# Patient Record
Sex: FEMALE | Race: WHITE | NOT HISPANIC OR LATINO | Employment: FULL TIME | URBAN - METROPOLITAN AREA
[De-identification: names, ages, dates, MRNs, and addresses within clinical notes are randomized per-mention and may not be internally consistent; named-entity substitution may affect disease eponyms.]

---

## 2017-08-22 LAB
EXTERNAL RUBELLA IGG QUANTITATION: NORMAL
HBV SURFACE AG SER QL: NEGATIVE
RPR SER QL: NORMAL

## 2018-03-08 LAB — GP B STREP SPEC QL CULT: NO GROWTH

## 2018-03-28 ENCOUNTER — ANESTHESIA EVENT (INPATIENT)
Dept: OBSTETRICS AND GYNECOLOGY | Facility: HOSPITAL | Age: 33
End: 2018-03-28
Payer: COMMERCIAL

## 2018-03-28 ENCOUNTER — HOSPITAL ENCOUNTER (INPATIENT)
Facility: HOSPITAL | Age: 33
LOS: 3 days | Discharge: HOME | End: 2018-03-31
Attending: OBSTETRICS & GYNECOLOGY | Admitting: OBSTETRICS & GYNECOLOGY
Payer: COMMERCIAL

## 2018-03-28 ENCOUNTER — ANESTHESIA (INPATIENT)
Dept: OBSTETRICS AND GYNECOLOGY | Facility: HOSPITAL | Age: 33
End: 2018-03-28
Payer: COMMERCIAL

## 2018-03-28 PROBLEM — O32.9XX0 FETAL MALPRESENTATION: Status: ACTIVE | Noted: 2018-03-28

## 2018-03-28 LAB
ABO + RH BLD: NORMAL
BLD GP AB SCN SERPL QL: NEGATIVE
D AG BLD QL: POSITIVE
ERYTHROCYTE [DISTWIDTH] IN BLOOD BY AUTOMATED COUNT: 13.1 % (ref 11.7–14.4)
HCT VFR BLDCO AUTO: 37.7 % (ref 35–45)
HGB BLD-MCNC: 13.1 G/DL (ref 11.8–15.7)
MCH RBC QN AUTO: 29.6 PG (ref 28–33.2)
MCHC RBC AUTO-ENTMCNC: 34.7 G/DL (ref 32.2–35.5)
MCV RBC AUTO: 85.3 FL (ref 83–98)
PDW BLD AUTO: 11 FL (ref 9.4–12.3)
PLATELET # BLD AUTO: 278 K/UL (ref 150–369)
RBC # BLD AUTO: 4.42 M/UL (ref 3.93–5.22)
RPR SER QL: NORMAL
WBC # BLD AUTO: 11.23 K/UL (ref 3.8–10.5)

## 2018-03-28 PROCEDURE — 37000005 ANESTHESIA CSE BLOCK: Performed by: ANESTHESIOLOGY

## 2018-03-28 PROCEDURE — 86901 BLOOD TYPING SEROLOGIC RH(D): CPT

## 2018-03-28 PROCEDURE — 27200121 HC CATH FOLEY

## 2018-03-28 PROCEDURE — 36415 COLL VENOUS BLD VENIPUNCTURE: CPT | Performed by: OBSTETRICS & GYNECOLOGY

## 2018-03-28 PROCEDURE — 25000000 HC PHARMACY GENERAL: Performed by: STUDENT IN AN ORGANIZED HEALTH CARE EDUCATION/TRAINING PROGRAM

## 2018-03-28 PROCEDURE — 63700000 HC SELF-ADMINISTRABLE DRUG: Performed by: OBSTETRICS & GYNECOLOGY

## 2018-03-28 PROCEDURE — 72000021 HC C SECTION LEVEL 1: Performed by: OBSTETRICS & GYNECOLOGY

## 2018-03-28 PROCEDURE — 86592 SYPHILIS TEST NON-TREP QUAL: CPT | Performed by: OBSTETRICS & GYNECOLOGY

## 2018-03-28 PROCEDURE — 63600000 HC DRUGS/DETAIL CODE: Performed by: OBSTETRICS & GYNECOLOGY

## 2018-03-28 PROCEDURE — 37000005 HC ANESTHESIA EPIDURAL/SPINAL: Performed by: OBSTETRICS & GYNECOLOGY

## 2018-03-28 PROCEDURE — 85027 COMPLETE CBC AUTOMATED: CPT | Performed by: OBSTETRICS & GYNECOLOGY

## 2018-03-28 PROCEDURE — 63600000 HC DRUGS/DETAIL CODE: Performed by: STUDENT IN AN ORGANIZED HEALTH CARE EDUCATION/TRAINING PROGRAM

## 2018-03-28 PROCEDURE — 63700000 HC SELF-ADMINISTRABLE DRUG: Performed by: STUDENT IN AN ORGANIZED HEALTH CARE EDUCATION/TRAINING PROGRAM

## 2018-03-28 PROCEDURE — 63600000 HC DRUGS/DETAIL CODE: Performed by: NURSE ANESTHETIST, CERTIFIED REGISTERED

## 2018-03-28 PROCEDURE — 25800000 HC PHARMACY IV SOLUTIONS: Performed by: OBSTETRICS & GYNECOLOGY

## 2018-03-28 PROCEDURE — 25000000 HC PHARMACY GENERAL: Performed by: NURSE ANESTHETIST, CERTIFIED REGISTERED

## 2018-03-28 PROCEDURE — 25800000 HC PHARMACY IV SOLUTIONS: Performed by: NURSE ANESTHETIST, CERTIFIED REGISTERED

## 2018-03-28 PROCEDURE — 12000000 HC ROOM AND CARE MED/SURG

## 2018-03-28 RX ORDER — KETOROLAC TROMETHAMINE 30 MG/ML
30 INJECTION, SOLUTION INTRAMUSCULAR; INTRAVENOUS
Status: DISPENSED | OUTPATIENT
Start: 2018-03-28 | End: 2018-03-30

## 2018-03-28 RX ORDER — ACETAMINOPHEN 325 MG/1
TABLET ORAL
Status: DISPENSED
Start: 2018-03-28 | End: 2018-03-29

## 2018-03-28 RX ORDER — DIPHENHYDRAMINE HYDROCHLORIDE 50 MG/ML
12.5 INJECTION INTRAMUSCULAR; INTRAVENOUS EVERY 6 HOURS PRN
Status: DISCONTINUED | OUTPATIENT
Start: 2018-03-28 | End: 2018-03-28 | Stop reason: HOSPADM

## 2018-03-28 RX ORDER — DIBUCAINE 1 %
1 OINTMENT (GRAM) TOPICAL AS NEEDED
Status: DISCONTINUED | OUTPATIENT
Start: 2018-03-28 | End: 2018-03-31 | Stop reason: HOSPADM

## 2018-03-28 RX ORDER — IBUPROFEN 600 MG/1
600 TABLET ORAL
Status: DISCONTINUED | OUTPATIENT
Start: 2018-03-30 | End: 2018-03-31 | Stop reason: HOSPADM

## 2018-03-28 RX ORDER — LANOLIN
1 WAX (GRAM) MISCELLANEOUS AS NEEDED
Status: DISCONTINUED | OUTPATIENT
Start: 2018-03-28 | End: 2018-03-31 | Stop reason: HOSPADM

## 2018-03-28 RX ORDER — OXYTOCIN/RINGER'S LACTATE 20/1000 ML
125 PLASTIC BAG, INJECTION (ML) INTRAVENOUS CONTINUOUS
Status: DISPENSED | OUTPATIENT
Start: 2018-03-28 | End: 2018-03-28

## 2018-03-28 RX ORDER — NALOXONE HYDROCHLORIDE 0.4 MG/ML
0.1 INJECTION, SOLUTION INTRAMUSCULAR; INTRAVENOUS; SUBCUTANEOUS
Status: DISCONTINUED | OUTPATIENT
Start: 2018-03-28 | End: 2018-03-28 | Stop reason: HOSPADM

## 2018-03-28 RX ORDER — GLYCOPYRROLATE 0.6MG/3ML
0.1 SYRINGE (ML) INTRAVENOUS ONCE
Status: COMPLETED | OUTPATIENT
Start: 2018-03-28 | End: 2018-03-28

## 2018-03-28 RX ORDER — DIPHENHYDRAMINE HYDROCHLORIDE 50 MG/ML
25 INJECTION INTRAMUSCULAR; INTRAVENOUS EVERY 6 HOURS PRN
Status: DISCONTINUED | OUTPATIENT
Start: 2018-03-28 | End: 2018-03-31 | Stop reason: HOSPADM

## 2018-03-28 RX ORDER — ACETAMINOPHEN 325 MG/1
975 TABLET ORAL EVERY 6 HOURS
Status: DISCONTINUED | OUTPATIENT
Start: 2018-03-28 | End: 2018-03-31 | Stop reason: HOSPADM

## 2018-03-28 RX ORDER — CALCIUM CARBONATE 200(500)MG
500 TABLET,CHEWABLE ORAL EVERY 4 HOURS PRN
Status: DISCONTINUED | OUTPATIENT
Start: 2018-03-28 | End: 2018-03-31 | Stop reason: HOSPADM

## 2018-03-28 RX ORDER — OXYTOCIN/RINGER'S LACTATE 20/1000 ML
PLASTIC BAG, INJECTION (ML) INTRAVENOUS AS NEEDED
Status: DISCONTINUED | OUTPATIENT
Start: 2018-03-28 | End: 2018-03-28 | Stop reason: SURG

## 2018-03-28 RX ORDER — NALOXONE HYDROCHLORIDE 0.4 MG/ML
0.4 INJECTION, SOLUTION INTRAMUSCULAR; INTRAVENOUS; SUBCUTANEOUS AS NEEDED
Status: ACTIVE | OUTPATIENT
Start: 2018-03-28 | End: 2018-03-30

## 2018-03-28 RX ORDER — ONDANSETRON 4 MG/1
4 TABLET, ORALLY DISINTEGRATING ORAL EVERY 8 HOURS PRN
Status: DISCONTINUED | OUTPATIENT
Start: 2018-03-28 | End: 2018-03-31 | Stop reason: HOSPADM

## 2018-03-28 RX ORDER — ONDANSETRON 8 MG/1
8 TABLET, ORALLY DISINTEGRATING ORAL ONCE
Status: COMPLETED | OUTPATIENT
Start: 2018-03-28 | End: 2018-03-28

## 2018-03-28 RX ORDER — MIDAZOLAM HYDROCHLORIDE 2 MG/2ML
INJECTION, SOLUTION INTRAMUSCULAR; INTRAVENOUS AS NEEDED
Status: DISCONTINUED | OUTPATIENT
Start: 2018-03-28 | End: 2018-03-28 | Stop reason: SURG

## 2018-03-28 RX ORDER — CEFAZOLIN SODIUM/WATER 1 G/10 ML
2 SYRINGE (ML) INTRAVENOUS
Status: COMPLETED | OUTPATIENT
Start: 2018-03-28 | End: 2018-03-28

## 2018-03-28 RX ORDER — DIPHENHYDRAMINE HCL 25 MG
25 CAPSULE ORAL EVERY 6 HOURS PRN
Status: DISCONTINUED | OUTPATIENT
Start: 2018-03-28 | End: 2018-03-31 | Stop reason: HOSPADM

## 2018-03-28 RX ORDER — ACETAMINOPHEN 325 MG/1
975 TABLET ORAL
Status: DISCONTINUED | OUTPATIENT
Start: 2018-03-28 | End: 2018-03-28

## 2018-03-28 RX ORDER — ONDANSETRON HYDROCHLORIDE 2 MG/ML
4 INJECTION, SOLUTION INTRAVENOUS EVERY 6 HOURS PRN
Status: DISCONTINUED | OUTPATIENT
Start: 2018-06-26 | End: 2018-03-28 | Stop reason: HOSPADM

## 2018-03-28 RX ORDER — PROCHLORPERAZINE EDISYLATE 5 MG/ML
10 INJECTION INTRAMUSCULAR; INTRAVENOUS EVERY 6 HOURS PRN
Status: DISCONTINUED | OUTPATIENT
Start: 2018-03-28 | End: 2018-03-28 | Stop reason: HOSPADM

## 2018-03-28 RX ORDER — SODIUM CHLORIDE, SODIUM LACTATE, POTASSIUM CHLORIDE, CALCIUM CHLORIDE 600; 310; 30; 20 MG/100ML; MG/100ML; MG/100ML; MG/100ML
1000 INJECTION, SOLUTION INTRAVENOUS ONCE
Status: COMPLETED | OUTPATIENT
Start: 2018-03-28 | End: 2018-03-29

## 2018-03-28 RX ORDER — HYDROMORPHONE HYDROCHLORIDE 2 MG/ML
0.5 INJECTION, SOLUTION INTRAMUSCULAR; INTRAVENOUS; SUBCUTANEOUS
Status: DISCONTINUED | OUTPATIENT
Start: 2018-03-28 | End: 2018-03-31 | Stop reason: HOSPADM

## 2018-03-28 RX ORDER — BUPIVACAINE HYDROCHLORIDE 5 MG/ML
INJECTION, SOLUTION EPIDURAL; INTRACAUDAL AS NEEDED
Status: DISCONTINUED | OUTPATIENT
Start: 2018-03-28 | End: 2018-03-28 | Stop reason: SURG

## 2018-03-28 RX ORDER — MORPHINE SULFATE 0.5 MG/ML
INJECTION, SOLUTION EPIDURAL; INTRATHECAL; INTRAVENOUS AS NEEDED
Status: DISCONTINUED | OUTPATIENT
Start: 2018-03-28 | End: 2018-03-28 | Stop reason: SURG

## 2018-03-28 RX ORDER — ALUMINUM HYDROXIDE, MAGNESIUM HYDROXIDE, AND SIMETHICONE 1200; 120; 1200 MG/30ML; MG/30ML; MG/30ML
30 SUSPENSION ORAL EVERY 4 HOURS PRN
Status: DISCONTINUED | OUTPATIENT
Start: 2018-03-28 | End: 2018-03-31 | Stop reason: HOSPADM

## 2018-03-28 RX ORDER — OXYCODONE HYDROCHLORIDE 5 MG/1
5-10 TABLET ORAL EVERY 4 HOURS PRN
Status: DISCONTINUED | OUTPATIENT
Start: 2018-03-28 | End: 2018-03-31 | Stop reason: HOSPADM

## 2018-03-28 RX ORDER — CEFAZOLIN SODIUM/WATER 1 G/10 ML
SYRINGE (ML) INTRAVENOUS AS NEEDED
Status: DISCONTINUED | OUTPATIENT
Start: 2018-03-28 | End: 2018-03-28 | Stop reason: SURG

## 2018-03-28 RX ORDER — SODIUM CHLORIDE, SODIUM LACTATE, POTASSIUM CHLORIDE, CALCIUM CHLORIDE 600; 310; 30; 20 MG/100ML; MG/100ML; MG/100ML; MG/100ML
80 INJECTION, SOLUTION INTRAVENOUS CONTINUOUS
Status: DISCONTINUED | OUTPATIENT
Start: 2018-03-28 | End: 2018-03-31 | Stop reason: HOSPADM

## 2018-03-28 RX ORDER — ONDANSETRON HYDROCHLORIDE 2 MG/ML
4 INJECTION, SOLUTION INTRAVENOUS EVERY 8 HOURS PRN
Status: DISCONTINUED | OUTPATIENT
Start: 2018-03-28 | End: 2018-03-31 | Stop reason: HOSPADM

## 2018-03-28 RX ORDER — FENTANYL CITRATE 50 UG/ML
INJECTION, SOLUTION INTRAMUSCULAR; INTRAVENOUS AS NEEDED
Status: DISCONTINUED | OUTPATIENT
Start: 2018-03-28 | End: 2018-03-28 | Stop reason: SURG

## 2018-03-28 RX ORDER — OXYTOCIN/RINGER'S LACTATE 20/1000 ML
PLASTIC BAG, INJECTION (ML) INTRAVENOUS AS NEEDED
Status: DISCONTINUED | OUTPATIENT
Start: 2018-03-28 | End: 2018-03-28

## 2018-03-28 RX ORDER — AMOXICILLIN 250 MG
1 CAPSULE ORAL 2 TIMES DAILY
Status: DISCONTINUED | OUTPATIENT
Start: 2018-03-28 | End: 2018-03-31 | Stop reason: HOSPADM

## 2018-03-28 RX ORDER — ONDANSETRON HYDROCHLORIDE 2 MG/ML
INJECTION, SOLUTION INTRAVENOUS
Status: DISPENSED
Start: 2018-03-28 | End: 2018-03-29

## 2018-03-28 RX ORDER — ACETAMINOPHEN 325 MG/1
975 TABLET ORAL ONCE
Status: COMPLETED | OUTPATIENT
Start: 2018-03-28 | End: 2018-03-28

## 2018-03-28 RX ORDER — SODIUM CHLORIDE, SODIUM GLUCONATE, SODIUM ACETATE, POTASSIUM CHLORIDE AND MAGNESIUM CHLORIDE 30; 37; 368; 526; 502 MG/100ML; MG/100ML; MG/100ML; MG/100ML; MG/100ML
INJECTION, SOLUTION INTRAVENOUS CONTINUOUS PRN
Status: DISCONTINUED | OUTPATIENT
Start: 2018-03-28 | End: 2018-03-28 | Stop reason: SURG

## 2018-03-28 RX ADMIN — FENTANYL CITRATE 25 MCG: 50 INJECTION, SOLUTION INTRAMUSCULAR; INTRAVENOUS at 10:04

## 2018-03-28 RX ADMIN — GLYCOPYRROLATE 0.2 MG: 0.2 INJECTION INTRAMUSCULAR; INTRAVENOUS at 10:06

## 2018-03-28 RX ADMIN — ACETAMINOPHEN 975 MG: 325 TABLET ORAL at 08:37

## 2018-03-28 RX ADMIN — Medication 2 G: at 09:09

## 2018-03-28 RX ADMIN — MORPHINE SULFATE 0.25 MG: 0.5 INJECTION, SOLUTION EPIDURAL; INTRATHECAL; INTRAVENOUS at 10:04

## 2018-03-28 RX ADMIN — ACETAMINOPHEN 975 MG: 325 TABLET ORAL at 20:52

## 2018-03-28 RX ADMIN — KETOROLAC TROMETHAMINE 30 MG: 30 INJECTION, SOLUTION INTRAMUSCULAR at 12:47

## 2018-03-28 RX ADMIN — Medication 125 ML/HR: at 14:05

## 2018-03-28 RX ADMIN — Medication 100 ML: at 11:05

## 2018-03-28 RX ADMIN — Medication 250 ML: at 10:32

## 2018-03-28 RX ADMIN — ONDANSETRON 8 MG: 8 TABLET, ORALLY DISINTEGRATING ORAL at 08:38

## 2018-03-28 RX ADMIN — Medication 250 ML: at 10:45

## 2018-03-28 RX ADMIN — Medication 1 G: at 10:12

## 2018-03-28 RX ADMIN — HYDROMORPHONE HYDROCHLORIDE 0.5 MG: 2 INJECTION, SOLUTION INTRAMUSCULAR; INTRAVENOUS; SUBCUTANEOUS at 17:27

## 2018-03-28 RX ADMIN — SODIUM CHLORIDE, POTASSIUM CHLORIDE, SODIUM LACTATE AND CALCIUM CHLORIDE 1000 ML: 600; 310; 30; 20 INJECTION, SOLUTION INTRAVENOUS at 09:23

## 2018-03-28 RX ADMIN — SENNOSIDES AND DOCUSATE SODIUM 1 TABLET: 8.6; 5 TABLET ORAL at 20:05

## 2018-03-28 RX ADMIN — BUPIVACAINE HYDROCHLORIDE 2.5 ML: 5 INJECTION, SOLUTION EPIDURAL; INTRACAUDAL; PERINEURAL at 10:04

## 2018-03-28 RX ADMIN — SODIUM CHLORIDE, SODIUM GLUCONATE, SODIUM ACETATE, POTASSIUM CHLORIDE AND MAGNESIUM CHLORIDE: 526; 502; 368; 37; 30 INJECTION, SOLUTION INTRAVENOUS at 10:00

## 2018-03-28 RX ADMIN — ONDANSETRON 4 MG: 2 INJECTION, SOLUTION INTRAMUSCULAR; INTRAVENOUS at 14:23

## 2018-03-28 RX ADMIN — PROMETHAZINE HYDROCHLORIDE 12.5 MG: 25 INJECTION INTRAMUSCULAR; INTRAVENOUS at 17:40

## 2018-03-28 RX ADMIN — SODIUM CHLORIDE, POTASSIUM CHLORIDE, SODIUM LACTATE AND CALCIUM CHLORIDE 80 ML/HR: 600; 310; 30; 20 INJECTION, SOLUTION INTRAVENOUS at 22:39

## 2018-03-28 RX ADMIN — MIDAZOLAM HYDROCHLORIDE 1 MG: 1 INJECTION, SOLUTION INTRAMUSCULAR; INTRAVENOUS at 10:07

## 2018-03-28 RX ADMIN — ACETAMINOPHEN 975 MG: 325 TABLET ORAL at 14:09

## 2018-03-28 RX ADMIN — KETOROLAC TROMETHAMINE 30 MG: 30 INJECTION, SOLUTION INTRAMUSCULAR at 18:41

## 2018-03-28 ASSESSMENT — PAIN SCALES - GENERAL: PAIN_LEVEL: 0

## 2018-03-28 NOTE — H&P (VIEW-ONLY)
HPI     Grace Mobley is a 32 y.o. female  at 39 2/7 wks with an estimated due date of 18 by LMP and first trimester u/s who presents for primary csxn due to breech presentation. Patient declined attempt at external cephalic version. This has been an uncomplicated pregnancy. CF/SMA/FX all neg. Seq. Screen neg. Placenta is posterior.    Last PO intake:   ,     ,      OB History:   Obstetric History       T0      L0     SAB0   TAB0   Ectopic0   Multiple0   Live Births0       # Outcome Date GA Lbr Elijah/2nd Weight Sex Delivery Anes PTL Lv   1 Current                   Medical History: Anxiety/depression, asthma, GERD, IBS, migraines    Surgical History: Lasik eye surgery    Social History:   Social History     Social History   • Marital status:      Spouse name: Zeyad   • Number of children: none   • Years of education: N/A     Social History Main Topics   • Smoking status: Never   • Smokeless tobacco: Never   • Alcohol use None   • Drug use: None   • Sexual activity: With partner     Other Topics Concern   • Not on file     Social History Narrative   • No narrative on file        Family History: Sister - diabetes Type I, Father - HTN    Allergies: Latex, natural rubber, Cipro    Prior to Admission medications    PNV       Review of Systems  All other systems reviewed and negative except as noted in the HPI.    Objective     Vital Signs for the last 24 hours:  To be done on admission    Latest cervical exam:             Additional Tests:   Sterile Speculum Exam: no      Fetal Monitoring:  FHR Baseline: on admission  FHR Variability:   FHR Accelerations:   FHR Decelerations:     Contraction Frequency:     Exam:  General Appearance: Alert, cooperative, no acute distress  Lungs: Clear to auscultation bilaterally, respirations unlabored  Heart: Regular rate and rhythm, S1 and S2 normal, no murmur, rub or gallop  Abdomen: gravid, nontender  Genitalia: See vaginal exam  Extremities:  no edema or calf tenderness  Neurologic: grossly intact without focal deficits    Ultrasounds:   I have reviewed the applicable Ultrasounds.    Bedside Ultrasounds:    Labs:  O + , Rubella IMMUNE, GBS neg    Assessment/Plan     Grace Mobley is a 32 y.o. female  at 39 2/7 wks for primary csxn due to breech presentation.    FHR: To be done on admission.  GBS: negative     Plan for Ancef for pre op abx. VTE prophylaxis with SCDs.    Dominique Ballard, DO

## 2018-03-28 NOTE — OP NOTE
OB  Delivery OP Note    Date of Procedure: 3/28/2018  Patient:Grace Mobley  :1985    Procedure:    Primary  Breech ( Latex Allergy )  CPT(R) Code:  78272 - [place] FULL ROUT OBSTE CARE, DELIV     Review the Delivery Report for details.      Details    Pre-Op Diagnosis: 1. 32 y.o.  Intrauterine pregnancy at 39w2d  2. Liza Breech    Post-Op Diagnosis: 1. Same    Procedure:    , Low Transverse  via   uterine incision and   skin incision.   Anesthesia: Epidural    Findings: Normal uterus, tubes, and ovaries.   EBL  700 mL   Complications: None     Specimen Information:  Cord blood X2 and public cord blood banking     Drains: Castillo draining clear urine    Staff:  Surgeon(s):  DO Venita Biswas MD  Anesthesiologist: Carl Costello MD  CRNA: Cherrie Bashir CRNA   Circulator: Ceci Lundy RN  Scrub Person: Graciela Siu  Baby Nurse: Judith Gallagher RN  Other Staff:     Delivery Assist;Delivery Nurse     INFANT INFORMATION  Time of Birth:10:30 AM   Presentation:    liza breech   Cord: 3 vessels ,Complications:  none   [place] Sex: female   [place] Weight: 3.3 kg      1 Minute 5 Minute 10 Minute   Apgar Totals: 8    9            Information for the patient's :  Julio C Paredes [[id number]]      Cord Gas     None          Informed Consent:  An informed consent was obtained.    Procedure Details:  The patient was taken to the operating room where Epidural  anesthesia was placed and found to be adequate. Antibiotics were given for infection prophylaxis. The patient was prepped and draped in the normal sterile fashion.  A timeout was performed.  A Pfannenstiel skin incision was made with the scalpel. The incision was carried down to the fascia using the bovie. The fascia was incised and this was extended laterally with the bovie. The fascia was grasped with Kocher clamps and the underlying rectus muscle was dissected  off.  The rectus muscles were  bluntly. The peritoneum was identified and entered sharply with metzembaums. The peritoneum was dissected to allow for adequate visualization.    The bladder blade was inserted. The vesicouterine peritoneum was identified.The lower uterine segment was then incised with a sharp incision and was extended bluntly. The amniotic sac was ruptured and Clear  fluid noted. The bladder blade was removed and the infant was delivered liza breech and delivered atraumatically using the usual maneuvers. The remainder of the infant was delivered, a spontaneous cry was heard, and the infant appeared to be moving all 4 extremities. The cord clamped and cut, cord blood x2 was collected, and public cord blood was collected as well.  The  baby was handed off to the awaiting clinicians and neonatology staff.    The placenta was removed manually. The uterus was then exteriorized and cleared of all clots and debris. The hysterotomy was repaired with #1 Vicryl in a running locked fashion. The ovaries and tubes appeared normal bilaterally. The uterus was then returned to the abdomen. The uterine incision was reinspected and found to be hemostatic. The gutters were inspected and cleared of all debris. The fascia was then reapproximated with a running suture of #1 Vicryl. The skin was closed with 4-0 Monocryl.    The patient tolerated the procedure well. The sponge, instrument, and needle counts were correct and the patient was taken to recovery in stable condition.      Venita Lopes MD

## 2018-03-28 NOTE — PROGRESS NOTES
Obstetrics Postpartum Progress Note    Events  No acute events overnight.    Subjective  Pain: no  Bleeding: lochia minimal  Diet: vomitted X2, unable to tolerate clears at this time, recieved zofran   Voiding: donaldson in place  Bowel: no flatus  Ambulating: as tolerated    Vitals  Temp:  [36.3 °C (97.4 °F)-36.9 °C (98.5 °F)] 36.3 °C (97.4 °F)  Heart Rate:  [] 53  Resp:  [22] 22  BP: (108-130)/(56-64) 130/59    I&O    Intake/Output Summary (Last 24 hours) at 18 1440  Last data filed at 18 1300   Gross per 24 hour   Intake             1050 ml   Output              850 ml   Net              200 ml       Physical Exam  General: well and resting  Heart: Regular rate and rhythm  Lungs: Clear to auscultation bilaterally  Abdomen: soft, nondistended, non-tender, positive bowel sounds  Fundus: firm  Incision: dressing clean, dry, intact  Perineum: deferred  Extremities: symmetric and no edema    Labs  Labs Reviewed:  Lab Results   Component Value Date    ABO O 2018    LABRH Positive 2018      Rubella: immune    Assessment/Plan   Problem-based Assessment and Plan    Grace Mobley is a 32 y.o.  postop day 0 s/p , Low Transverse  2/2 breech presentation     1. Vital Signs: stable  2. Hemodynamics: stable, CBC in am, preop hg 13.1   3. Pain: controlled  4. VTE Assessment: Early Ambulation, SCDs  5. Vaccinations/Rhogam: rhogam not indicated   6. Post care: meeting all goals   7. Will remove donaldson in am if UOP is adequate      Venita Lopes MD

## 2018-03-28 NOTE — ANESTHESIA POSTPROCEDURE EVALUATION
"Patient: Grace Mobley    Procedure Summary     Date:  18 Room / Location:  [facility] L&D 1 / [facility] L[facility] OR    Anesthesia Start:  1000 Anesthesia Stop:  1122    Procedure:  Primary  Breech ( Latex Allergy ) (N/A Abdomen) Diagnosis:  (Preg)    Surgeon:  Dominique Ballard DO Responsible Provider:  Carl Costello MD    Anesthesia Type:  epidural ASA Status:  2      Vital normal range: yes  CV function and hydration status stable: yes  Respiratory function stable and airway patent: yes  Mental status recovered: yes  Pain control satisfactory: yes  Nausea/ vomiting controlled and satisfactory: yes    Discharge from PACU.    Anesthesia Type: epidural  PACU Vitals  3/28/2018 1114 - 3/28/2018 1214      3/28/2018 1128 3/28/2018 1134 3/28/2018 1135 3/28/2018 1138    BP: - - (!)  114/56 -    Temp: - 36.3 °C (97.4 °F) - -    Pulse: 70 62 - (!)  58    Resp: - (!)  22 - -    SpO2: 97 % 97 % - 97 %              3/28/2018 1143 3/28/2018 1149 3/28/2018 1150 3/28/2018 1153    BP: - - 108/64 -    Temp: - - - -    Pulse: (!)  56 (!)  58 (!)  55 62    Resp: - - - -    SpO2: 96 % 97 % - 96 %              3/28/2018 1158 3/28/2018 1204 3/28/2018 1205 3/28/2018 1208    BP: - - 109/61 -    Temp: - - - -    Pulse: (!)  58 62 60 (!)  58    Resp: - - - -    SpO2: 96 % 97 % - 96 %              3/28/2018 1213             BP: -       Temp: -       Pulse: (!)  58       Resp: -       SpO2: 96 %           Blood pressure (!) 130/59, pulse (!) 55, temperature 36.3 °C (97.4 °F), temperature source Oral, resp. rate (!) 22, height 1.626 m (5' 4\"), weight 64 kg (141 lb), SpO2 96 %, currently breastfeeding.      Anesthesia Post Evaluation    Pain score: 0  Pain management: adequate  Mode of pain management: additional block medications  Patient location during evaluation: PACU  Patient participation: complete - patient participated  Level of consciousness: awake and alert  Cardiovascular status: acceptable  Respiratory status: " acceptable  Hydration status: acceptable  Pain well controlled after spinal preservative free morphine administration: Yes  Continue 24 hours observation after preservative free morphine administration: Yes  Anesthetic complications: no

## 2018-03-28 NOTE — OR SURGEON
Pre-Procedure patient identification:  I am the primary operating surgeon/proceduralist and I have identified the patient on 03/28/18 at 12:14 PM Dominique Ballard DO  Phone Number: [phone]

## 2018-03-28 NOTE — ANESTHESIA PREPROCEDURE EVALUATION
Anesthesia ROS/MED HX    Anesthesia History - neg  Pulmonary - neg  Neuro/Psych - neg  Cardiovascular- neg  GI/Hepatic- neg  Endo/Other- neg   Body Habitus: Normal      Relevant Problems   No active problems are marked relevant to this note.     I have reviewed the following records for  Grace Mobley.      Current Facility-Administered Medications   Medication Dose Route Frequency Provider Last Rate Last Dose   • ceFAZolin in sterile water (ANCEF) injection 2 g  2 g intravenous 60 min Pre-Op Dominique Ballard DO       • lactated ringer's infusion 1,000 mL  1,000 mL intravenous Once Dominique Ballard DO         Prior to Admission medications    Not on File        Carl Costello MD        Physical Exam    Airway   Mallampati: I   TM distance: <3 FB   Neck ROM: full  Cardiovascular - normal   Rhythm: regular   Rate: normal  Pulmonary - normal   clear to auscultation  Other Findings   Back - neg   landmarks identified          Anesthesia Plan    Plan: epidural   ASA 2  Blood Products:     Use of Blood Products Discussed: Yes   Anesthetic plan and risks discussed with: patient  Postop Plan:   Patient Disposition: inpatient floor planned admission   Pain Management: IV analgesics

## 2018-03-28 NOTE — ANESTHESIA PROCEDURE NOTES
CSE Block    Patient location during procedure: OR  Start time: 3/28/2018 10:01 AM  End time: 3/28/2018 10:05 AM  Staffing  Anesthesiologist: AUSTIN MARINO  Performed: anesthesiologist   Preanesthetic Checklist  Completed: patient identified, surgical consent, pre-op evaluation, timeout performed, IV checked, risks and benefits discussed and monitors and equipment checked  CSE  Patient position: sitting  Prep: Betadine and site prepped and draped  Patient monitoring: heart rate, cardiac monitor, continuous pulse ox and blood pressure  Approach: midline  Spinal Needle  Needle type: Sprotte tip   Needle gauge: 24 G  Needle length: 4 in  Needle insertion depth: 7 cm  Epidural Needle  Injection technique: KEISHA air  Needle type: Tuohy   Needle gauge: 17 G  Needle length: 3.5 in  Needle insertion depth: 6 cm  Location: lumbar (1-5)  Catheter  Catheter type: Single orifice  Catheter size: 19 G  Catheter at skin depth: 14 cm  Test dose: lidocaine 1.5% with epinephrine 1-to-200,000  Assessment  Sensory level: T4  Events: cerebrospinal fluid  Additional Notes  Procedure tolerated well. Vital signs are stable

## 2018-03-28 NOTE — PROGRESS NOTES
"Labor and Delivery Progress Note    Subjective     Interval History: none.     Patient comfortable and reports +fm, no VB, no LOF, no ctx.  Pt reports last solid PO intake yesterday at 1930 and last PO water intake today at 0618.  Pt reports feeling \"anxious\" about the procedure.     Objective     Vital Signs for the last 24 hours:  Temp:  [36.9 °C (98.5 °F)] 36.9 °C (98.5 °F)       Fetal Monitoring:  FHR Baseline: 135  FHR Variability: moderate  FHR Accelerations: present  FHR Decelerations: none     Contraction Frequency: ctx q 2-7min    Latest cervical exam:  Cervix deferred    Bedside Us:  Fetus liza breech presentation per Dr. Cain    Current Facility-Administered Medications:   •  ceFAZolin in sterile water (ANCEF) injection 2 g, 2 g, intravenous, 60 min Pre-Op, Dominique Ballard DO  •  lactated ringer's infusion 1,000 mL, 1,000 mL, intravenous, Once, Dominique Ballard DO    Assessment/Plan     Grace Mobley is a 32 y.o. female  at Unknown admitted with  secondary to breech presentation    -FHR: Reactive  -GBS: negative   -primary C/S: pt for Ancef on call to OR, enhanced recovery protocol; standard labs ordered; c/w routine pre-op care  -Dr. Costello (anesthesia), at bedside assessing the patient    SANDY Angelo        "

## 2018-03-28 NOTE — PLAN OF CARE
Problem:  Delivery (Adult,Obstetrics,Pediatric)  Goal: Signs and Symptoms of Listed Potential Problems Will be Absent, Minimized or Managed ( Delivery)  Outcome: Ongoing (interventions implemented as appropriate)

## 2018-03-28 NOTE — PLAN OF CARE
Problem:  Delivery (Adult,Obstetrics,Pediatric)  Goal: Signs and Symptoms of Listed Potential Problems Will be Absent, Minimized or Managed ( Delivery)  Outcome: Outcome(s) Achieved Date Met: 18 1144    Delivery   Problems Assessed ( Delivery) all   Problems Present ( Delivery) none

## 2018-03-29 LAB
ERYTHROCYTE [DISTWIDTH] IN BLOOD BY AUTOMATED COUNT: 13.1 % (ref 11.7–14.4)
HCT VFR BLDCO AUTO: 29.2 % (ref 35–45)
HGB BLD-MCNC: 9.7 G/DL (ref 11.8–15.7)
MCH RBC QN AUTO: 29.9 PG (ref 28–33.2)
MCHC RBC AUTO-ENTMCNC: 33.2 G/DL (ref 32.2–35.5)
MCV RBC AUTO: 90.1 FL (ref 83–98)
PDW BLD AUTO: 10.8 FL (ref 9.4–12.3)
PLATELET # BLD AUTO: 213 K/UL (ref 150–369)
RBC # BLD AUTO: 3.24 M/UL (ref 3.93–5.22)
WBC # BLD AUTO: 13.11 K/UL (ref 3.8–10.5)

## 2018-03-29 PROCEDURE — 63700000 HC SELF-ADMINISTRABLE DRUG: Performed by: STUDENT IN AN ORGANIZED HEALTH CARE EDUCATION/TRAINING PROGRAM

## 2018-03-29 PROCEDURE — 12000000 HC ROOM AND CARE MED/SURG

## 2018-03-29 PROCEDURE — 85027 COMPLETE CBC AUTOMATED: CPT | Performed by: STUDENT IN AN ORGANIZED HEALTH CARE EDUCATION/TRAINING PROGRAM

## 2018-03-29 PROCEDURE — 63600000 HC DRUGS/DETAIL CODE: Performed by: STUDENT IN AN ORGANIZED HEALTH CARE EDUCATION/TRAINING PROGRAM

## 2018-03-29 PROCEDURE — 36415 COLL VENOUS BLD VENIPUNCTURE: CPT | Performed by: STUDENT IN AN ORGANIZED HEALTH CARE EDUCATION/TRAINING PROGRAM

## 2018-03-29 RX ORDER — SIMETHICONE 80 MG
80 TABLET,CHEWABLE ORAL EVERY 6 HOURS PRN
Status: DISCONTINUED | OUTPATIENT
Start: 2018-03-29 | End: 2018-03-31 | Stop reason: HOSPADM

## 2018-03-29 RX ADMIN — ACETAMINOPHEN 975 MG: 325 TABLET ORAL at 15:06

## 2018-03-29 RX ADMIN — SENNOSIDES AND DOCUSATE SODIUM 1 TABLET: 8.6; 5 TABLET ORAL at 07:42

## 2018-03-29 RX ADMIN — KETOROLAC TROMETHAMINE 30 MG: 30 INJECTION, SOLUTION INTRAMUSCULAR at 18:31

## 2018-03-29 RX ADMIN — ACETAMINOPHEN 975 MG: 325 TABLET ORAL at 07:40

## 2018-03-29 RX ADMIN — KETOROLAC TROMETHAMINE 30 MG: 30 INJECTION, SOLUTION INTRAMUSCULAR at 00:35

## 2018-03-29 RX ADMIN — KETOROLAC TROMETHAMINE 30 MG: 30 INJECTION, SOLUTION INTRAMUSCULAR at 06:23

## 2018-03-29 RX ADMIN — KETOROLAC TROMETHAMINE 30 MG: 30 INJECTION, SOLUTION INTRAMUSCULAR at 12:48

## 2018-03-29 RX ADMIN — PRENATAL VIT W/ FE FUMARATE-FA TAB 27-0.8 MG 1 TABLET: 27-0.8 TAB at 07:41

## 2018-03-29 RX ADMIN — ACETAMINOPHEN 975 MG: 325 TABLET ORAL at 02:33

## 2018-03-29 NOTE — PLAN OF CARE
Problem: Patient Care Overview  Goal: Individualization & Mutuality   03/29/18 1108   Individualization   Patient Specific Goals Patient will be less anxious   Mutuality/Individual Preferences   What Anxieties, Fears, Concerns, or Questions Do You Have About Your Care? Fear that baby isn't breastfeeding well

## 2018-03-29 NOTE — PLAN OF CARE
Problem: Patient Care Overview  Goal: Interprofessional Rounds/Family Conf  Outcome: Ongoing (interventions implemented as appropriate)   03/29/18 1106   Interdisciplinary Rounds/Family Conf   Participants family

## 2018-03-29 NOTE — ANESTHESIA POSTPROCEDURE EVALUATION
Patient: Grace Mobley    Procedure Summary     Date:  18 Room / Location:  [facility] L[facility] / [facility]    Anesthesia Start:  1000 Anesthesia Stop:  1122    Procedure:  Primary  Breech ( Latex Allergy ) (N/A Abdomen) Diagnosis:  (Preg)    Surgeon:  Dominique Ballard DO Responsible Provider:  Carl Costello MD    Anesthesia Type:  epidural ASA Status:  2          Anesthesia Type: epidural  PACU Vitals  3/28/2018 1114 - 3/28/2018 1214      3/28/2018 1128 3/28/2018 1134 3/28/2018 1135 3/28/2018 1138    BP: - - (!)  114/56 -    Temp: - 36.3 °C (97.4 °F) - -    Pulse: 70 62 - (!)  58    Resp: - (!)  22 - -    SpO2: 97 % 97 % - 97 %              3/28/2018 1143 3/28/2018 1149 3/28/2018 1150 3/28/2018 1153    BP: - - 108/64 -    Temp: - - - -    Pulse: (!)  56 (!)  58 (!)  55 62    Resp: - - - -    SpO2: 96 % 97 % - 96 %              3/28/2018 1158 3/28/2018 1204 3/28/2018 1205 3/28/2018 1208    BP: - - 109/61 -    Temp: - - - -    Pulse: (!)  58 62 60 (!)  58    Resp: - - - -    SpO2: 96 % 97 % - 96 %              3/28/2018 1213             BP: -       Temp: -       Pulse: (!)  58       Resp: -       SpO2: 96 %               Anesthesia Post Evaluation    Pain management: satisfactory to patient  Mode of pain management: IV medication and additional block medications  Patient location during evaluation: floor  Patient participation: complete - patient participated  Level of consciousness: awake and alert  Cardiovascular status: acceptable and hemodynamically stable  Respiratory status: acceptable  Hydration status: stable  Continue 24 hours observation after preservative free morphine administration: Yes  Anesthetic complications: no

## 2018-03-29 NOTE — PLAN OF CARE
Problem: Patient Care Overview  Goal: Discharge Needs Assessment  Outcome: Ongoing (interventions implemented as appropriate)   03/29/18 1107   DC Needs Assessment   Concerns To Be Addressed coping/stress concerns   Readmission Within The Last 30 Days no previous admission in last 30 days   Provider Choice List(s) Given no   Equipment Needed After Discharge none   Current Health   Anticipated Changes Related to Illness none   Activity/Self Care ROS   Equipment Currently Used at Home none

## 2018-03-29 NOTE — PLAN OF CARE
Problem: Patient Care Overview  Goal: Plan of Care Review  Outcome: Ongoing (interventions implemented as appropriate)   18 0370   Coping/Psychosocial   Plan Of Care Reviewed With patient   Plan of Care Review   Progress improving       Problem: Postpartum ( Delivery) (Adult,Obstetrics,Pediatric)  Goal: Signs and Symptoms of Listed Potential Problems Will be Absent, Minimized or Managed (Postpartum)  Outcome: Ongoing (interventions implemented as appropriate)      Problem: Pressure Ulcer Risk (Joshua Scale) (Adult,Obstetrics,Pediatric)  Goal: Identify Related Risk Factors and Signs and Symptoms  Outcome: Ongoing (interventions implemented as appropriate)

## 2018-03-29 NOTE — LACTATION NOTE
RN request assistance as baby sleepy and unable to latch with glucose of 47. Demonstrated hand expression and spoon fed 0.75mL. Baby woke up and began rooting but then would not suck at breast. Placed S2S and set mother up to pump w/[employer]. Rev'd basic BF info, expected milk production and feeding cues. Rev'd pumping guidelines. Advised to call for assistance when baby shows more feeding cues.

## 2018-03-29 NOTE — PROGRESS NOTES
Obstetrics Postpartum Progress Note    Events  Patient seen and examined. No acute events overnight. Pain controlled. Mary reg diet. Donaldson removed this am. Not yet ambulating. No CP/SOB/dizziness.     Subjective  Pain: yes- contorlled w meds   Bleeding: lochia moderate  Diet: taking regular diet  Voiding: donaldson discontinued, awaiting spontaneous void  Bowel: no flatus  Ambulating: not ambulating    Vitals  Temp:  [36.3 °C (97.4 °F)-37.1 °C (98.8 °F)] 36.8 °C (98.2 °F)  Heart Rate:  [] 58  Resp:  [16-22] 18  BP: (101-143)/(55-78) 108/55    I&O    Intake/Output Summary (Last 24 hours) at 18 0719  Last data filed at 18 0600   Gross per 24 hour   Intake          3106.25 ml   Output             2925 ml   Net           181.25 ml       Physical Exam  General: Well  Heart: Regular rate and rhythm  Lungs: Clear to auscultation bilaterally  Abdomen: soft, nondistended, non-tender, nondistended, no rebound/regidity/guarding.  Fundus: firm, at umbilicus and nontender  Incision: healing well. Clean,dry, and intact  Perineum: deferred  Extremities: 1+ edema    Labs  Labs Reviewed:  Lab Results   Component Value Date    ABO O 2018    LABRH Positive 2018      Rubella: immune    Assessment/Plan   Problem-based Assessment and Plan    Grace Mobley is a 32 y.o.  postop day 1 s/p , Low Transverse  2/2 liza breech position.     1. Vital Signs: stable  2. Hemodynamics: stable, Hgb 13.1 to 9.7  3. Pain: controlled  4. VTE Assessment: SCDs, early ambulation  5. Vaccinations/Rhogam: rhogam not indicated   6. Post care: meeting all goals   7. For TOV, UO adequate overnight     Amirah Cain MD

## 2018-03-30 ENCOUNTER — DOCUMENTATION (OUTPATIENT)
Dept: FAMILY MEDICINE | Facility: CLINIC | Age: 33
End: 2018-03-30

## 2018-03-30 DIAGNOSIS — L25.9 CONTACT DERMATITIS, UNSPECIFIED CONTACT DERMATITIS TYPE, UNSPECIFIED TRIGGER: Primary | ICD-10-CM

## 2018-03-30 PROCEDURE — 12000000 HC ROOM AND CARE MED/SURG

## 2018-03-30 PROCEDURE — 63700000 HC SELF-ADMINISTRABLE DRUG: Performed by: STUDENT IN AN ORGANIZED HEALTH CARE EDUCATION/TRAINING PROGRAM

## 2018-03-30 PROCEDURE — 63700000 HC SELF-ADMINISTRABLE DRUG: Performed by: OBSTETRICS & GYNECOLOGY

## 2018-03-30 PROCEDURE — 63600000 HC DRUGS/DETAIL CODE: Performed by: STUDENT IN AN ORGANIZED HEALTH CARE EDUCATION/TRAINING PROGRAM

## 2018-03-30 RX ORDER — SILVER SULFADIAZINE 10 G/1000G
CREAM TOPICAL AS NEEDED
Status: DISCONTINUED | OUTPATIENT
Start: 2018-03-30 | End: 2018-03-31 | Stop reason: HOSPADM

## 2018-03-30 RX ADMIN — SENNOSIDES AND DOCUSATE SODIUM 1 TABLET: 8.6; 5 TABLET ORAL at 20:01

## 2018-03-30 RX ADMIN — IBUPROFEN 600 MG: 600 TABLET, FILM COATED ORAL at 19:01

## 2018-03-30 RX ADMIN — KETOROLAC TROMETHAMINE 30 MG: 30 INJECTION, SOLUTION INTRAMUSCULAR at 06:20

## 2018-03-30 RX ADMIN — PRENATAL VIT W/ FE FUMARATE-FA TAB 27-0.8 MG 1 TABLET: 27-0.8 TAB at 09:32

## 2018-03-30 RX ADMIN — ACETAMINOPHEN 975 MG: 325 TABLET ORAL at 20:00

## 2018-03-30 RX ADMIN — SENNOSIDES AND DOCUSATE SODIUM 1 TABLET: 8.6; 5 TABLET ORAL at 09:32

## 2018-03-30 RX ADMIN — ACETAMINOPHEN 975 MG: 325 TABLET ORAL at 09:31

## 2018-03-30 RX ADMIN — Medication: at 21:04

## 2018-03-30 RX ADMIN — SIMETHICONE 80 MG: 80 TABLET, CHEWABLE ORAL at 21:03

## 2018-03-30 RX ADMIN — KETOROLAC TROMETHAMINE 30 MG: 30 INJECTION, SOLUTION INTRAMUSCULAR at 00:25

## 2018-03-30 RX ADMIN — ACETAMINOPHEN 975 MG: 325 TABLET ORAL at 02:23

## 2018-03-30 NOTE — PROGRESS NOTES
Obstetrics Postpartum Progress Note    Events  Pt seen/examined. No acute events overnight.    Subjective  Pain: yes  Bleeding: lochia minimal  Diet: taking regular diet  Voiding: without difficulty  Bowel: passing flatus  Ambulating: as tolerated    Vitals  Temp:  [36.7 °C (98 °F)-36.9 °C (98.5 °F)] 36.8 °C (98.3 °F)  Heart Rate:  [56-58] 58  Resp:  [16-18] 18  BP: (110-127)/(55-60) 127/60    I&O  No intake or output data in the 24 hours ending 18 0654    Physical Exam  General: A&Ox3 and NAD   Heart: Regular rate and rhythm  Lungs: Clear to auscultation bilaterally  Abdomen: soft, nondistended, non-tender, positive bowel sounds, no rebound/rigidity/guarding.  Incision: healing well, no significant drainage, no dehiscence and no significant erythema  Fundus: firm  Extremities: symmetric and no edema    Labs  Labs Reviewed:  Lab Results   Component Value Date    ABO O 2018    LABRH Positive 2018      Rubella: immune    Assessment/Plan   Problem-based Assessment and Plan    Grace Mobley is a 32 y.o.  POD#2 2 s/p , Low Transverse .    1. Vital Signs: stable  2. Hemodynamics: stable, hg 13.1 --> 9.7, no s/sx of anemia   3. Pain: controlled  4. VTE Assessment: Early Ambulation, SCDs  5. Vaccinations/Rhogam: rhogam not indicated   6. Post care: meeting all goals     Venita Lopes MD

## 2018-03-30 NOTE — PLAN OF CARE
Problem: Patient Care Overview  Goal: Plan of Care Review  Outcome: Ongoing (interventions implemented as appropriate)   03/30/18 0500   Coping/Psychosocial   Plan Of Care Reviewed With patient;spouse   Plan of Care Review   Progress progress toward functional goals as expected

## 2018-03-30 NOTE — PROGRESS NOTES
SW faxed referral to [facility] for WELL visits upon discharge.  [facility] [phone].   Referral faxed to [phone] [facility].

## 2018-03-30 NOTE — PROGRESS NOTES
"  Subjective     Patient ID: Grace Mobley is a 32 y.o. female.    Ms. Hang Mobley c/o non-painful, non-pruritic red \"rash\" on her abdomen.  She does not recall onset and states that she just noticed it.  She reports a history of \"sensitive skin.\"        Review of Systems  Denies pain or pruritis at lesion site.     Objective     Physical Exam   Erythematous rash on abdomet diffusely, concentrated at left epigastric region.  Also present at mid-back in two parallel vertical patches.      Assessment/Plan   Problem List Items Addressed This Visit     Contact dermatitis - Primary     Not currently bothersome to patient.   Ordered prn silvadene.                     "

## 2018-03-30 NOTE — LACTATION NOTE
Infant transferred to NICU overnight for hypoglycemia. Mom up to NICU for feeding and states infant BF fairly well on 1 side. Mom currently pumping. +colostrum. Reviewed all pumping instructions. Reviewed BF teaching and book. Questions answered. Mom has pump for home.

## 2018-03-30 NOTE — PLAN OF CARE
Problem: Postpartum ( Delivery) (Adult,Obstetrics,Pediatric)  Goal: Signs and Symptoms of Listed Potential Problems Will be Absent, Minimized or Managed (Postpartum)  Outcome: Ongoing (interventions implemented as appropriate)   18 0500   Postpartum ( Delivery)   Problems Assessed (Postpartum ) all   Problems Present (Postpartum ) none

## 2018-03-30 NOTE — PROGRESS NOTES
SW consulted for MOB  with HX of anxiety and depression.  SW met with MOB and FOB at bedside to discuss current anxious mood EPDS 2.  MOB states she is anxious due to  going to NICU for sugars and unplanned .  MOB is feeling overwhelmed and upset for 's NICU status.  FOB expressed that he was visiting in NICU and witnessed a emergency with another  which has put him on heightened alert and anxiety for his own .  SW validated those concerns and assured parents they would be able to visit NICU and get updates on .  RN will transport them up after SW visit.  MOB did state she has a history of anxiety and depression and has a therapist in her area.  SW educated couple on PPD signs and symptoms.  Literature was left for couple to review.  MOB is open to contacting OBGYN or [facility] for assistance in the next few weeks if symptoms increase.  SW is referring MOB to visiting nurse for WELL visits.  Awaiting confirmation.  Emotional support given and SW following for DC needs.  [phone]

## 2018-03-30 NOTE — PLAN OF CARE
Problem: Postpartum ( Delivery) (Adult,Obstetrics,Pediatric)  Goal: Anesthesia/Sedation Recovery  Outcome: Ongoing (interventions implemented as appropriate)   18 1220   Goal/Outcome Evaluation   Anesthesia/Sedation Recovery recovered to baseline

## 2018-03-31 VITALS
WEIGHT: 141 LBS | BODY MASS INDEX: 24.07 KG/M2 | OXYGEN SATURATION: 95 % | TEMPERATURE: 97.9 F | SYSTOLIC BLOOD PRESSURE: 126 MMHG | DIASTOLIC BLOOD PRESSURE: 81 MMHG | HEIGHT: 64 IN | HEART RATE: 80 BPM | RESPIRATION RATE: 20 BRPM

## 2018-03-31 PROCEDURE — 63700000 HC SELF-ADMINISTRABLE DRUG: Performed by: STUDENT IN AN ORGANIZED HEALTH CARE EDUCATION/TRAINING PROGRAM

## 2018-03-31 RX ORDER — OXYCODONE AND ACETAMINOPHEN 5; 325 MG/1; MG/1
1-2 TABLET ORAL EVERY 4 HOURS PRN
Qty: 30 TABLET | Refills: 0 | Status: SHIPPED | OUTPATIENT
Start: 2018-03-31 | End: 2018-04-05

## 2018-03-31 RX ORDER — IBUPROFEN 600 MG/1
600 TABLET ORAL
Qty: 60 TABLET | Refills: 1 | Status: SHIPPED | OUTPATIENT
Start: 2018-03-31 | End: 2020-07-09

## 2018-03-31 RX ADMIN — PRENATAL VIT W/ FE FUMARATE-FA TAB 27-0.8 MG 1 TABLET: 27-0.8 TAB at 08:37

## 2018-03-31 RX ADMIN — ACETAMINOPHEN 975 MG: 325 TABLET ORAL at 08:36

## 2018-03-31 RX ADMIN — ACETAMINOPHEN 975 MG: 325 TABLET ORAL at 02:17

## 2018-03-31 RX ADMIN — ACETAMINOPHEN 325 MG: 325 TABLET ORAL at 13:54

## 2018-03-31 RX ADMIN — IBUPROFEN 600 MG: 600 TABLET, FILM COATED ORAL at 06:48

## 2018-03-31 RX ADMIN — IBUPROFEN 600 MG: 600 TABLET, FILM COATED ORAL at 00:50

## 2018-03-31 RX ADMIN — SENNOSIDES AND DOCUSATE SODIUM 1 TABLET: 8.6; 5 TABLET ORAL at 08:37

## 2018-03-31 NOTE — PLAN OF CARE
Problem: Postpartum ( Delivery) (Adult,Obstetrics,Pediatric)  Goal: Signs and Symptoms of Listed Potential Problems Will be Absent, Minimized or Managed (Postpartum)  Outcome: Ongoing (interventions implemented as appropriate)

## 2018-03-31 NOTE — DISCHARGE SUMMARY
Inpatient Discharge Summary    Obstetrical Discharge Form          Date of Delivery: 3/28/2018 at 10:30 AM     Gestational Age:39w2d    Delivered By: Dominique Ballard     Delivery Type: primary  section, low transverse incision    Antepartum complications: none    Baby: Liveborn female , Apgars 8   /9   , 3.3 kg     Anesthesia: Epidural     Intrapartum complications: breech presentation    Feeding method: breast    Rh Immune globulin given: not applicable    Discharge Date: 3/31/18      Plan:    Follow-up appointment with your doctors in 6 weeks, please call for an appointment

## 2018-03-31 NOTE — PROGRESS NOTES
Obstetrics Postpartum Progress Note    Events  Patient seen and examined. No acute events overnight.     Subjective  Pain: Well controlled  Bleeding: lochia minimal  Diet: taking regular diet  Voiding: without difficulty  Bowel: passing flatus  Ambulating: as tolerated  Denies: Chest pain, Shortness of breath, Fevers and Chills    Vitals  Temp:  [36.6 °C (97.8 °F)-37 °C (98.6 °F)] 36.6 °C (97.8 °F)  Heart Rate:  [58-75] 58  Resp:  [18] 18  BP: (122-133)/(63-77) 122/77    Physical Exam  General: well  Heart: Regular rate and rhythm  Lungs: Clear to auscultation bilaterally  Abdomen: soft, positive bowel sounds, mildly distended. Incision c/d/i. contact dermatitis from drape  Fundus: firm and below umbilicus  Extremities: symmetric    Labs  Labs Reviewed:  Lab Results   Component Value Date    ABO O 2018    LABRH Positive 2018      Rubella: immune    Assessment/Plan   Problem-based Assessment and Plan    Grace Mobley is a 32 y.o.  postop day 3 s/p , Low Transverse .    - AFVSS  - O+ / Rubella Immune  - Hemodynamics stable, no signs or symptoms of acute anemia  - Regular diet  - VTE Assessment: Early Ambulation  - Post care: meeting all goals. Continue routine care.    Nunu Pillai DO

## 2018-03-31 NOTE — LACTATION NOTE
Mom breastfeeding, supplementing and pumping. Mom pumping 1 ounce and milk coming in. Discussed normal milk production and feeding guidelines for next 2-3 days. Discussed BF resources and encouraged home LC visit.

## 2018-03-31 NOTE — PLAN OF CARE
Problem: Breastfeeding (Adult,Obstetrics,Pediatric)  Goal: Signs and Symptoms of Listed Potential Problems Will be Absent, Minimized or Managed (Breastfeeding)  Outcome: Adequate for Discharge   03/31/18 1058   Breastfeeding   Problems Assessed (Breastfeeding) all   Problems Present (Breastfeeding) none

## 2018-04-20 ENCOUNTER — OFFICE VISIT (OUTPATIENT)
Dept: OBGYN CLINIC | Facility: CLINIC | Age: 33
End: 2018-04-20
Payer: COMMERCIAL

## 2018-04-20 VITALS
DIASTOLIC BLOOD PRESSURE: 70 MMHG | WEIGHT: 129 LBS | HEIGHT: 64 IN | SYSTOLIC BLOOD PRESSURE: 110 MMHG | BODY MASS INDEX: 22.02 KG/M2 | HEART RATE: 74 BPM

## 2018-04-20 DIAGNOSIS — S54.31XA: Primary | ICD-10-CM

## 2018-04-20 PROCEDURE — 99203 OFFICE O/P NEW LOW 30 MIN: CPT | Performed by: ORTHOPAEDIC SURGERY

## 2018-04-20 RX ORDER — ALBUTEROL SULFATE 90 UG/1
2 AEROSOL, METERED RESPIRATORY (INHALATION) EVERY 6 HOURS PRN
COMMUNITY

## 2018-04-20 RX ORDER — IBUPROFEN 600 MG/1
600 TABLET ORAL EVERY 6 HOURS
Refills: 1 | COMMUNITY
Start: 2018-03-31 | End: 2018-07-26 | Stop reason: HOSPADM

## 2018-04-20 NOTE — ASSESSMENT & PLAN NOTE
Findings consistent with right lateral antebrachial cutaneous nerve compression  Discussed findings and treatment options with the patient  Discussed pathology of patient's condition  I recommended patient to avoid flexion of the right elbow against resistant for prolonged period time  I encouraged patient to do stretching exercises and continue use of NSAID  If patient's symptoms continues in 3-4 weeks, we will check EMG of her right upper extremity  Patient may need surgical treatment if her symptoms does not resolve  All patient's questions were answered to his satisfaction  This note is created using dictation transcription  It may contain typographical errors, grammatical errors, improperly dictated words, background noise and other errors

## 2018-04-20 NOTE — PROGRESS NOTES
Assessment:     1  Lateral antebrachial cutaneous nerve injury, right, initial encounter        Plan:     Problem List Items Addressed This Visit        Nervous and Auditory    Lateral antebrachial cutaneous nerve injury, right, initial encounter - Primary     Findings consistent with right lateral antebrachial cutaneous nerve compression  Discussed findings and treatment options with the patient  Discussed pathology of patient's condition  I recommended patient to avoid flexion of the right elbow against resistant for prolonged period time  I encouraged patient to do stretching exercises and continue use of NSAID  If patient's symptoms continues in 3-4 weeks, we will check EMG of her right upper extremity  Patient may need surgical treatment if her symptoms does not resolve  All patient's questions were answered to his satisfaction  This note is created using dictation transcription  It may contain typographical errors, grammatical errors, improperly dictated words, background noise and other errors  Subjective:     Patient ID: Rubi Stanley is a 28 y o  female  Chief Complaint:  40-year-old white female with gradual onset of right forearm pain and numbness for the past few weeks  Patient has been caring for her  and has been doing breast feeding  She noticed increased pain over the right forearm and gradually developed of numbness  She had increased pain with extension of her elbow after flex for a period time  Pain sometime is sharp burning which resolved in numbness over an area of her forearm  She denies any weakness in her fingers, hand, wrist, forearm, and elbow  Information on patient's intake form was reviewed        Allergy:  Allergies   Allergen Reactions    Ciprofloxacin Itching    Latex Rash     Medications:  all current active meds have been reviewed  Past Medical History:  Past Medical History:   Diagnosis Date    Asthma      Past Surgical History:  Past Surgical History:   Procedure Laterality Date     SECTION      LASIK      WISDOM TOOTH EXTRACTION  2006     Family History:  Family History   Problem Relation Age of Onset    Hypertension Father     Diabetes Sister      Social History:  History   Alcohol Use    Yes     Comment: social      History   Drug Use No     History   Smoking Status    Never Smoker   Smokeless Tobacco    Never Used     Review of Systems   Constitutional: Negative  HENT: Negative  Eyes: Negative  Respiratory: Negative  Cardiovascular: Negative  Gastrointestinal: Negative  Endocrine: Negative  Genitourinary: Negative  Musculoskeletal: Positive for arthralgias (right forearm)  Skin: Negative  Allergic/Immunologic: Negative  Neurological: Positive for numbness  Negative for weakness  Hematological: Negative  Psychiatric/Behavioral: Negative  Objective:  BP Readings from Last 1 Encounters:   18 110/70      Wt Readings from Last 1 Encounters:   18 58 5 kg (129 lb)      BMI:   Estimated body mass index is 22 14 kg/m² as calculated from the following:    Height as of this encounter: 5' 4" (1 626 m)  Weight as of this encounter: 58 5 kg (129 lb)  BSA:   Estimated body surface area is 1 62 meters squared as calculated from the following:    Height as of this encounter: 5' 4" (1 626 m)  Weight as of this encounter: 58 5 kg (129 lb)  Physical Exam   Constitutional: She is oriented to person, place, and time  She appears well-developed  HENT:   Head: Normocephalic and atraumatic  Eyes: EOM are normal  Pupils are equal, round, and reactive to light  Neck: Neck supple  Neurological: She is alert and oriented to person, place, and time  Skin: Skin is warm  Psychiatric: She has a normal mood and affect  Nursing note and vitals reviewed  Right Elbow Exam     Tenderness   The patient is experiencing no tenderness           Range of Motion   The patient has normal right elbow ROM  Muscle Strength   The patient has normal right elbow strength      Tests Tinel's Sign (cubital tunnel): negative    Other   Erythema: absent  Sensation: normal  Pulse: present    Comments:  Decreased sensation along the radial volar aspect of the forearm  Motor function are all intact

## 2018-06-15 ENCOUNTER — TELEPHONE (OUTPATIENT)
Dept: OBGYN CLINIC | Facility: HOSPITAL | Age: 33
End: 2018-06-15

## 2018-06-15 DIAGNOSIS — S54.31XD: Primary | ICD-10-CM

## 2018-06-15 NOTE — TELEPHONE ENCOUNTER
Kerri Logan  [phone]  Dr Caleb Greer    Patient advise symptoms not getting better, would like to proceed with EMG as discussed in your last visit  Please advise patient of your next step

## 2018-06-15 NOTE — TELEPHONE ENCOUNTER
EMG prescription complete  Please set patient up for EMG and give follow up appointment with Dr Marielos Demarco to go over results  Thank you

## 2018-07-26 ENCOUNTER — OFFICE VISIT (OUTPATIENT)
Dept: OBGYN CLINIC | Facility: CLINIC | Age: 33
End: 2018-07-26
Payer: COMMERCIAL

## 2018-07-26 VITALS
BODY MASS INDEX: 20.83 KG/M2 | HEIGHT: 64 IN | WEIGHT: 122 LBS | HEART RATE: 78 BPM | SYSTOLIC BLOOD PRESSURE: 110 MMHG | DIASTOLIC BLOOD PRESSURE: 72 MMHG

## 2018-07-26 DIAGNOSIS — S54.31XD: Primary | ICD-10-CM

## 2018-07-26 PROCEDURE — 99213 OFFICE O/P EST LOW 20 MIN: CPT | Performed by: ORTHOPAEDIC SURGERY

## 2018-07-26 NOTE — PROGRESS NOTES
Assessment:     1  Injury of right lateral antebrachial cutaneous nerve, subsequent encounter        Plan:     Problem List Items Addressed This Visit        Nervous and Auditory    Injury of right lateral antebrachial cutaneous nerve - Primary     Findings consistent with right lateral antebrachial cutaneous nerve injury  Discussed findings and treatment options with the patient  I reviewed patient's right upper extremity EMG test   Discussed prognosis of her problem  Since patient has no pain in the right forearm and the numbness does not seem to affect her function, I recommended continue observation of her symptoms  I advised patient to contact me if her symptoms worsen, otherwise will see patient back as needed  All patient's questions were answered to her satisfaction  This note is created using dictation transcription  It may contain typographical errors, grammatical errors, improperly dictated words, background noise and other errors  Subjective:     Patient ID: Janis Ortiz is a 28 y o  female  Chief Complaint:  60-year-old white female follow up right forearm pain and numbness  Patient returns today to review her right upper extremity EMG test   Her pain had resolved only occasional discomfort but still has numbness over the volar aspect of the forearm  Patient stated the numbness does not affect her daily activities and work function  She no longer has the pain        Allergy:  Allergies   Allergen Reactions    Ciprofloxacin Itching    Latex Rash     Medications:  all current active meds have been reviewed  Past Medical History:  Past Medical History:   Diagnosis Date    Asthma      Past Surgical History:  Past Surgical History:   Procedure Laterality Date     SECTION      LASIK  2012    WISDOM TOOTH EXTRACTION  2006     Family History:  Family History   Problem Relation Age of Onset    Hypertension Father     Diabetes Sister      Social History:  History Alcohol Use    Yes     Comment: social      History   Drug Use No     History   Smoking Status    Never Smoker   Smokeless Tobacco    Never Used     Review of Systems   Constitutional: Negative  HENT: Negative  Eyes: Negative  Respiratory: Negative  Cardiovascular: Negative  Gastrointestinal: Negative  Endocrine: Negative  Genitourinary: Negative  Musculoskeletal: Negative for arthralgias (right forearm)  Skin: Negative  Allergic/Immunologic: Negative  Neurological: Positive for numbness (Right forearm)  Negative for weakness  Hematological: Negative  Psychiatric/Behavioral: Negative  Objective:  BP Readings from Last 1 Encounters:   07/26/18 110/72      Wt Readings from Last 1 Encounters:   07/26/18 55 3 kg (122 lb)      BMI:   Estimated body mass index is 20 94 kg/m² as calculated from the following:    Height as of this encounter: 5' 4" (1 626 m)  Weight as of this encounter: 55 3 kg (122 lb)  BSA:   Estimated body surface area is 1 58 meters squared as calculated from the following:    Height as of this encounter: 5' 4" (1 626 m)  Weight as of this encounter: 55 3 kg (122 lb)  Physical Exam   Constitutional: She is oriented to person, place, and time  She appears well-developed and well-nourished  HENT:   Head: Normocephalic and atraumatic  Eyes: EOM are normal  Pupils are equal, round, and reactive to light  Neck: Neck supple  Neurological: She is alert and oriented to person, place, and time  Skin: Skin is warm  Psychiatric: She has a normal mood and affect  Nursing note and vitals reviewed  Right Elbow Exam     Tenderness   The patient is experiencing no tenderness  Range of Motion   The patient has normal right elbow ROM  Muscle Strength   The patient has normal right elbow strength      Tests Tinel's Sign (cubital tunnel): negative    Other   Erythema: absent  Sensation: normal  Pulse: present    Comments:  Decreased sensation along the radial volar aspect of the forearm  Motor function are all intact

## 2018-07-26 NOTE — ASSESSMENT & PLAN NOTE
Findings consistent with right lateral antebrachial cutaneous nerve injury  Discussed findings and treatment options with the patient  I reviewed patient's right upper extremity EMG test   Discussed prognosis of her problem  Since patient has no pain in the right forearm and the numbness does not seem to affect her function, I recommended continue observation of her symptoms  I advised patient to contact me if her symptoms worsen, otherwise will see patient back as needed  All patient's questions were answered to her satisfaction  This note is created using dictation transcription  It may contain typographical errors, grammatical errors, improperly dictated words, background noise and other errors

## 2019-02-19 ENCOUNTER — OFFICE VISIT (OUTPATIENT)
Dept: OBGYN CLINIC | Facility: CLINIC | Age: 34
End: 2019-02-19
Payer: COMMERCIAL

## 2019-02-19 VITALS
DIASTOLIC BLOOD PRESSURE: 79 MMHG | WEIGHT: 116.4 LBS | HEART RATE: 97 BPM | SYSTOLIC BLOOD PRESSURE: 115 MMHG | HEIGHT: 64 IN | BODY MASS INDEX: 19.87 KG/M2

## 2019-02-19 DIAGNOSIS — R10.31 RIGHT GROIN PAIN: ICD-10-CM

## 2019-02-19 DIAGNOSIS — M25.551 PAIN IN RIGHT HIP: Primary | ICD-10-CM

## 2019-02-19 PROCEDURE — 99213 OFFICE O/P EST LOW 20 MIN: CPT | Performed by: ORTHOPAEDIC SURGERY

## 2019-02-19 NOTE — PROGRESS NOTES
Orthopaedic Surgery - Office Note  Fide Belle (89 y o  female)   : 1985   MRN: [id number]  Encounter Date: 2019    Chief Complaint   Patient presents with    Right Hip - Pain       Assessment / Plan  1  Right groin pain  2  Lower extremity numbess    1  Referred patient to general surgery for Right groin pain  2  She was counseled on the importance of being aware of how she lays and applies pressure on her body when breast feeding her daughter to provent nerve impingement that causes her numbness symptoms  · Return if symptoms worsen or fail to improve  History of Present Illness  Fide Belle is a 35 y o  female who presents to the office today with right groin pain and numbness in her lower extremity  She stated that she has been experiencing these symptoms for a couple weeks  Her groin pain is achy in nature and comes and goes  She stated that there isn't any particular activity that causes her pain  She notices it most during the day, waking up in the morning it isn't present and this pain doesn't wake her from sleep  She stated that she believed it was a hernia but her PCP sent her for a transvaginal ultra sound which was negative  In addition, she is also experiencing numbness in her hip  The numbness is located at the later aspect of her hip  She stated that t doesn't go down her leg  She stated that 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, she will usually fall asleep with her daughter on top of her in odd positions and she will wake up with her hip being numb  She stated that she tries to be aware of how she puts pressure on her body, like her daughter laying on her  She stated that removing her daughter, relives the numbness  She denies any recent trauma  She denies back pain  She denies muscle weakness       Review of Systems  Const: negative  Eyes: negative  HEENT: negative  Resp: negative  MSK:positive for arthralgias  Neuro: positive for paresthesia and weakness    Physical Exam  /79   Pulse 97   Ht 5' 4" (1 626 m)   Wt 52 8 kg (116 lb 6 4 oz)   BMI 19 98 kg/m²   Cons: Appears well  No apparent distress  Psych: Alert  Oriented x3  Mood and affect normal   Eyes: PERRLA, EOMI  Resp: Normal effort  No audible wheezing or stridor  CV: Palpable pulse  No discernable arrhythmia  No LE edema  Lymph:  No palpable cervical, axillary, or inguinal lymphadenopathy  Skin: Warm  No palpable masses  No visible lesions  Neuro: Normal muscle tone  Normal and symmetric DTR's  Right Hip Exam  Alignment / Posture:  Normal lumbar alignment  Normal resting hip posture  Inspection:  No swelling  No edema  No erythema  No ecchymosis  Palpation:  No tenderness  No effusion  No crepitus  ROM:  Normal hip ROM  Without pain Normal knee ROM  Strength:  5/5 hip flexors and abductors  Neurovascular:  Sensation intact in DP/SP/Pinto/Sa/T nerve distributions  Toes warm and perfused  Studies Reviewed  No studies to review    Procedures  No procedures today  Medical, Surgical, Family, and Social History  The patient's medical history, family history, and social history, were reviewed and updated as appropriate      Past Medical History:   Diagnosis Date    Asthma        Past Surgical History:   Procedure Laterality Date     SECTION      LASIK  2012    WISDOM TOOTH EXTRACTION  2006       Family History   Problem Relation Age of Onset    Hypertension Father     Diabetes Sister        Social History     Occupational History    Not on file   Tobacco Use    Smoking status: Never Smoker    Smokeless tobacco: Never Used   Substance and Sexual Activity    Alcohol use: Yes     Comment: social     Drug use: No    Sexual activity: Not on file       Allergies   Allergen Reactions    Ciprofloxacin Itching    Latex Rash         Current Outpatient Medications:     albuterol (PROVENTIL HFA,VENTOLIN HFA) 90 mcg/act inhaler, Inhale 2 puffs every 6 (six) hours as needed for wheezing, Disp: , Rfl:     Docusate Sodium (COLACE PO), Take by mouth, Disp: , Rfl:       Crescencio Valverde PA-C

## 2019-05-02 ENCOUNTER — TRANSCRIBE ORDERS (OUTPATIENT)
Dept: SCHEDULING | Age: 34
End: 2019-05-02

## 2019-05-02 DIAGNOSIS — G51.0 BELL'S PALSY: Primary | ICD-10-CM

## 2019-05-02 DIAGNOSIS — R20.2 PARESTHESIA OF SKIN: ICD-10-CM

## 2019-05-25 ENCOUNTER — HOSPITAL ENCOUNTER (OUTPATIENT)
Dept: RADIOLOGY | Age: 34
Discharge: HOME | End: 2019-05-25
Attending: PSYCHIATRY & NEUROLOGY
Payer: COMMERCIAL

## 2019-05-25 DIAGNOSIS — R20.2 PARESTHESIA OF SKIN: ICD-10-CM

## 2019-05-25 DIAGNOSIS — G51.0 BELL'S PALSY: ICD-10-CM

## 2019-05-25 RX ORDER — GADOBUTROL 604.72 MG/ML
5 INJECTION INTRAVENOUS
Status: COMPLETED | OUTPATIENT
Start: 2019-05-25 | End: 2019-05-25

## 2019-05-25 RX ADMIN — GADOBUTROL 5 MMOL: 604.72 INJECTION INTRAVENOUS at 10:18

## 2019-09-17 ENCOUNTER — OFFICE VISIT (OUTPATIENT)
Dept: SURGERY | Facility: CLINIC | Age: 34
End: 2019-09-17
Payer: COMMERCIAL

## 2019-09-17 VITALS
DIASTOLIC BLOOD PRESSURE: 66 MMHG | WEIGHT: 115 LBS | HEIGHT: 64 IN | SYSTOLIC BLOOD PRESSURE: 109 MMHG | BODY MASS INDEX: 19.63 KG/M2 | HEART RATE: 72 BPM

## 2019-09-17 DIAGNOSIS — R10.31 RIGHT INGUINAL PAIN: Primary | ICD-10-CM

## 2019-09-17 PROCEDURE — 99243 OFF/OP CNSLTJ NEW/EST LOW 30: CPT | Performed by: SURGERY

## 2019-09-17 NOTE — LETTER
2019     Dominique Ballard, DO  [address]  [place], [address]  [facility] [address]    Patient: Grace Mobley  YOB: 1985  Date of Visit: 2019      Dear Dr. Ballard:    Thank you for referring Grace Mobley to me for evaluation. Below are my notes for this consultation.    If you have questions, please do not hesitate to call me. My mobile phone number is [phone], and my office number is [phone].  I look forward to following your patient along with you.        Sincerely,        Rex Bess MD        CC: Rex Romero MD  2019 12:35 PM  Signed  Patient ID: Grace Mobley                              : 1985  MRN: [id number]                                            Visit Date: 2019  Encounter Provider: Rex Bess    Chief Complaint: James Mobley is a 34 y.o. female presenting for evaluation of right inguinal pain.  She complains of intermittent right inguinal pain that started last November, about 8 months after her  section.  She reported to her gynecologist.  She says the hernia was suspected, but not seen on physical exam.  A transvaginal ultrasound was unrevealing.  She describes a pain or pressure that comes and goes in the right inguinal region, close to the lateral end of her Pfannenstiel scar.  Sometimes it radiates down to her thigh and sometimes towards her hip or flank.  She has not noticed a lump or bulge, but she says the area always feels somewhat irregular to her.  She has not been doing much physical exercise.  She is still breast-feeding her daughter, and her menstrual periods resumed about 4 months ago.  Her most recent episode of pain was somewhat more severe, and it coincided with her menstrual period.  She is planning to try to get pregnant again this month, and she wants to make sure it will be safe to do so.    Medications:   Current  "Outpatient Prescriptions:   •  prenatal vit-iron fum-folic ac 27 mg iron- 0.8 mg tablet tablet, Take 1 tablet by mouth daily., Disp: , Rfl:     Past Medical History:  has a past medical history of Asthma and Depression.   Past Surgical History:  has a past surgical history that includes  section 2018; LASIK ; and [place] tooth extraction .   Social History:  reports that she has never smoked. She has never used smokeless tobacco. She reports that she does not drink alcohol or use drugs.  She works as a  for [employer].  Family History: family history includes Cancer in her paternal grandfather; Diabetes in her paternal grandfather and sister; Hypertension in her biological father; Stroke in her maternal grandfather and paternal grandmother.   Allergies: is allergic to ciprofloxacin; latex, natural rubber; and pineapple.     Review of Systems:  Fourteen systems were reviewed, and the patient answered positively for abdominal pain and environmental allergies. All other systems are negative.     Physical Exam  Vitals: /66 (BP Location: Right forearm, Patient Position: Sitting)   Pulse 72   Ht 1.626 m (5' 4\")   Wt 52.2 kg (115 lb)   BMI 19.74 kg/m²    General appearance: Very pleasant, no acute distress. Alert and oriented times three.  HEENT: Normocephalic, without obvious abnormality, atraumatic. Conjunctiva clear. Sclerae anicteric. Nares normal. Nasal mucosa normal. No drainage. Neck supple with no adenopathy.  Lungs: Clear to auscultation and percussion bilaterally.   Heart: Regular rate and rhythm.  Normal S1 and S2 without murmer.  Abdomen: Thin, soft, nontender and nondistended.  Well-healed Pfannenstiel scar.  No inguinal hernia is palpable on the right or left.  No palpable mass or abdominal wall irregularity.  No tenderness of the right pubic tubercle, thigh adductor tendon, or rectus abdominis tendon.  Extremities: Warm and well perfused. No edema.  Skin: No " rash, lesion, ecchymosis, or jaundice.  Neurologic:  Grossly normal. Normal mood and affect.    Assessment and Plan:  Right inguinal pain without a hernia or other evidence of surgical pathology on physical exam.  We discussed the possibility of endometriosis of scar, or perhaps a sports hernia, which could be diagnosed with an MRI.  I assured her these are not dangerous conditions, and she may safely get pregnant.  We discussed the possibility of surgery for either of these conditions, but she is not interested in having surgery at this point.  Therefore, we will forego the MRI, and she may follow-up as needed.    Rex Bess MD

## 2019-09-17 NOTE — PROGRESS NOTES
Patient ID: Grace Mobley                              : 1985  MRN: [id number]                                            Visit Date: 2019  Encounter Provider: Rex Bess    Chief Complaint: Hernia    Grace Mobley is a 34 y.o. female presenting for evaluation of right inguinal pain.  She complains of intermittent right inguinal pain that started last November, about 8 months after her  section.  She reported to her gynecologist.  She says the hernia was suspected, but not seen on physical exam.  A transvaginal ultrasound was unrevealing.  She describes a pain or pressure that comes and goes in the right inguinal region, close to the lateral end of her Pfannenstiel scar.  Sometimes it radiates down to her thigh and sometimes towards her hip or flank.  She has not noticed a lump or bulge, but she says the area always feels somewhat irregular to her.  She has not been doing much physical exercise.  She is still breast-feeding her daughter, and her menstrual periods resumed about 4 months ago.  Her most recent episode of pain was somewhat more severe, and it coincided with her menstrual period.  She is planning to try to get pregnant again this month, and she wants to make sure it will be safe to do so.    Medications:   Current Outpatient Prescriptions:   •  prenatal vit-iron fum-folic ac 27 mg iron- 0.8 mg tablet tablet, Take 1 tablet by mouth daily., Disp: , Rfl:     Past Medical History:  has a past medical history of Asthma and Depression.   Past Surgical History:  has a past surgical history that includes  section 2018; LASIK ; and [place] tooth extraction .   Social History:  reports that she has never smoked. She has never used smokeless tobacco. She reports that she does not drink alcohol or use drugs.  She works as a  for [employer].  Family History: family history includes Cancer in her paternal grandfather; Diabetes in  "her paternal grandfather and sister; Hypertension in her biological father; Stroke in her maternal grandfather and paternal grandmother.   Allergies: is allergic to ciprofloxacin; latex, natural rubber; and pineapple.     Review of Systems:  Fourteen systems were reviewed, and the patient answered positively for abdominal pain and environmental allergies. All other systems are negative.     Physical Exam  Vitals: /66 (BP Location: Right forearm, Patient Position: Sitting)   Pulse 72   Ht 1.626 m (5' 4\")   Wt 52.2 kg (115 lb)   BMI 19.74 kg/m²   General appearance: Very pleasant, no acute distress. Alert and oriented times three.  HEENT: Normocephalic, without obvious abnormality, atraumatic. Conjunctiva clear. Sclerae anicteric. Nares normal. Nasal mucosa normal. No drainage. Neck supple with no adenopathy.  Lungs: Clear to auscultation and percussion bilaterally.   Heart: Regular rate and rhythm.  Normal S1 and S2 without murmer.  Abdomen: Thin, soft, nontender and nondistended.  Well-healed Pfannenstiel scar.  No inguinal hernia is palpable on the right or left.  No palpable mass or abdominal wall irregularity.  No tenderness of the right pubic tubercle, thigh adductor tendon, or rectus abdominis tendon.  Extremities: Warm and well perfused. No edema.  Skin: No rash, lesion, ecchymosis, or jaundice.  Neurologic:  Grossly normal. Normal mood and affect.    Assessment and Plan:  Right inguinal pain without a hernia or other evidence of surgical pathology on physical exam.  We discussed the possibility of endometriosis of scar, or perhaps a sports hernia, which could be diagnosed with an MRI.  I assured her these are not dangerous conditions, and she may safely get pregnant.  We discussed the possibility of surgery for either of these conditions, but she is not interested in having surgery at this point.  Therefore, we will forego the MRI, and she may follow-up as needed.    Rex Bess MD    "

## 2019-12-09 LAB
HBV SURFACE AG SER QL: NONREACTIVE
HIV 1+2 AB+HIV1 P24 AG SERPL QL IA: NONREACTIVE
QST CHLAMYDIA TRACHOMATIS RNA, TMA: NEGATIVE
QST NEISSERIA GONORRHOEAE RNA, TMA: NEGATIVE
T PALLIDUM AB SER QL IF: NONREACTIVE

## 2019-12-13 ENCOUNTER — TRANSCRIBE ORDERS (OUTPATIENT)
Dept: SCHEDULING | Age: 34
End: 2019-12-13

## 2019-12-13 DIAGNOSIS — O35.10X0 MATERNAL CARE FOR (SUSPECTED) CHROMOSOMAL ABNORMALITY IN FETUS, NOT APPLICABLE OR UNSPECIFIED: ICD-10-CM

## 2019-12-13 DIAGNOSIS — Z36.9 ENCOUNTER FOR ANTENATAL SCREENING OF MOTHER: Primary | ICD-10-CM

## 2019-12-31 ENCOUNTER — HOSPITAL ENCOUNTER (OUTPATIENT)
Dept: PERINATAL CARE | Facility: HOSPITAL | Age: 34
Discharge: HOME | End: 2019-12-31
Attending: OBSTETRICS & GYNECOLOGY
Payer: COMMERCIAL

## 2019-12-31 DIAGNOSIS — Z36.9 ENCOUNTER FOR ANTENATAL SCREENING OF MOTHER: ICD-10-CM

## 2019-12-31 DIAGNOSIS — Z36.82 ENCOUNTER FOR (NT) NUCHAL TRANSLUCENCY SCAN: ICD-10-CM

## 2019-12-31 DIAGNOSIS — O36.80X0 ENCOUNTER TO DETERMINE FETAL VIABILITY OF PREGNANCY, SINGLE OR UNSPECIFIED FETUS: ICD-10-CM

## 2019-12-31 DIAGNOSIS — O35.10X0 MATERNAL CARE FOR (SUSPECTED) CHROMOSOMAL ABNORMALITY IN FETUS, NOT APPLICABLE OR UNSPECIFIED: ICD-10-CM

## 2019-12-31 DIAGNOSIS — Z36.3 ANTENATAL SCREENING FOR MALFORMATION USING ULTRASONICS: ICD-10-CM

## 2019-12-31 DIAGNOSIS — Z3A.12 12 WEEKS GESTATION OF PREGNANCY: ICD-10-CM

## 2019-12-31 PROCEDURE — 76813 OB US NUCHAL MEAS 1 GEST: CPT

## 2020-01-27 ENCOUNTER — TRANSCRIBE ORDERS (OUTPATIENT)
Dept: SCHEDULING | Age: 35
End: 2020-01-27

## 2020-01-27 DIAGNOSIS — Z36.9 ENCOUNTER FOR ANTENATAL SCREENING OF MOTHER: Primary | ICD-10-CM

## 2020-01-27 DIAGNOSIS — O35.9XX0 MATERNAL CARE FOR (SUSPECTED) FETAL ABNORMALITY AND DAMAGE, UNSPECIFIED, NOT APPLICABLE OR UNSPECIFIED: ICD-10-CM

## 2020-02-26 ENCOUNTER — HOSPITAL ENCOUNTER (OUTPATIENT)
Dept: PERINATAL CARE | Facility: HOSPITAL | Age: 35
Discharge: HOME | End: 2020-02-26
Attending: OBSTETRICS & GYNECOLOGY
Payer: COMMERCIAL

## 2020-02-26 DIAGNOSIS — Z36.9 ENCOUNTER FOR ANTENATAL SCREENING OF MOTHER: ICD-10-CM

## 2020-02-26 DIAGNOSIS — O35.9XX0 MATERNAL CARE FOR (SUSPECTED) FETAL ABNORMALITY AND DAMAGE, UNSPECIFIED, NOT APPLICABLE OR UNSPECIFIED: ICD-10-CM

## 2020-02-26 DIAGNOSIS — O35.9XX0 SUSPECTED FETAL ANOMALY, ANTEPARTUM, SINGLE OR UNSPECIFIED FETUS: ICD-10-CM

## 2020-02-26 DIAGNOSIS — Z36.3 ANTENATAL SCREENING FOR MALFORMATION USING ULTRASONICS: ICD-10-CM

## 2020-02-26 DIAGNOSIS — Z3A.20 20 WEEKS GESTATION OF PREGNANCY: ICD-10-CM

## 2020-02-26 PROCEDURE — 76805 OB US >/= 14 WKS SNGL FETUS: CPT

## 2020-05-18 ENCOUNTER — TRANSCRIBE ORDERS (OUTPATIENT)
Dept: SCHEDULING | Age: 35
End: 2020-05-18

## 2020-05-18 DIAGNOSIS — O34.219 MATERNAL CARE FOR UNSPECIFIED TYPE SCAR FROM PREVIOUS CESAREAN DELIVERY: ICD-10-CM

## 2020-05-18 DIAGNOSIS — O09.893 SUPERVISION OF OTHER HIGH RISK PREGNANCIES, THIRD TRIMESTER: Primary | ICD-10-CM

## 2020-05-28 ENCOUNTER — HOSPITAL ENCOUNTER (OUTPATIENT)
Dept: PERINATAL CARE | Facility: HOSPITAL | Age: 35
Discharge: HOME | End: 2020-05-28
Attending: OBSTETRICS & GYNECOLOGY
Payer: COMMERCIAL

## 2020-05-28 DIAGNOSIS — O34.219 MATERNAL CARE FOR UNSPECIFIED TYPE SCAR FROM PREVIOUS CESAREAN DELIVERY: ICD-10-CM

## 2020-05-28 DIAGNOSIS — O09.893 SUPERVISION OF OTHER HIGH RISK PREGNANCIES, THIRD TRIMESTER: ICD-10-CM

## 2020-05-28 DIAGNOSIS — Z3A.34 34 WEEKS GESTATION OF PREGNANCY: ICD-10-CM

## 2020-05-28 DIAGNOSIS — O26.843 UTERINE SIZE DATE DISCREPANCY, THIRD TRIMESTER: ICD-10-CM

## 2020-05-28 PROCEDURE — 76816 OB US FOLLOW-UP PER FETUS: CPT

## 2020-06-15 LAB — GP B STREP SPEC QL CULT: ABNORMAL

## 2020-07-08 ENCOUNTER — HOSPITAL ENCOUNTER (INPATIENT)
Facility: HOSPITAL | Age: 35
LOS: 2 days | Discharge: HOME | End: 2020-07-10
Attending: OBSTETRICS & GYNECOLOGY | Admitting: OBSTETRICS & GYNECOLOGY
Payer: COMMERCIAL

## 2020-07-08 ENCOUNTER — ANESTHESIA EVENT (INPATIENT)
Dept: OBSTETRICS AND GYNECOLOGY | Facility: HOSPITAL | Age: 35
End: 2020-07-08
Payer: COMMERCIAL

## 2020-07-08 ENCOUNTER — ANESTHESIA (INPATIENT)
Dept: OBSTETRICS AND GYNECOLOGY | Facility: HOSPITAL | Age: 35
End: 2020-07-08
Payer: COMMERCIAL

## 2020-07-08 PROBLEM — Z34.90 ENCOUNTER FOR INDUCTION OF LABOR: Status: ACTIVE | Noted: 2020-07-08

## 2020-07-08 LAB
A1 MICROGLOB PLACENTAL VAG QL: POSITIVE
ABO + RH BLD: NORMAL
BLD GP AB SCN SERPL QL: NEGATIVE
CANDIDA RRNA VAG QL PROBE: NOT DETECTED
D AG BLD QL: POSITIVE
ERYTHROCYTE [DISTWIDTH] IN BLOOD BY AUTOMATED COUNT: 13.3 % (ref 11.7–14.4)
G VAGINALIS RRNA GENITAL QL PROBE: DETECTED
HCT VFR BLDCO AUTO: 38.7 % (ref 35–45)
HGB BLD-MCNC: 13.2 G/DL (ref 11.8–15.7)
MCH RBC QN AUTO: 30.1 PG (ref 28–33.2)
MCHC RBC AUTO-ENTMCNC: 34.1 G/DL (ref 32.2–35.5)
MCV RBC AUTO: 88.4 FL (ref 83–98)
PDW BLD AUTO: 10.7 FL (ref 9.4–12.3)
PLATELET # BLD AUTO: 287 K/UL (ref 150–369)
RBC # BLD AUTO: 4.38 M/UL (ref 3.93–5.22)
SARS-COV-2 RNA RESP QL NAA+PROBE: NEGATIVE
T PALLIDUM AB SER QL IF: NONREACTIVE
T VAGINALIS RRNA GENITAL QL PROBE: NOT DETECTED
WBC # BLD AUTO: 15.24 K/UL (ref 3.8–10.5)

## 2020-07-08 PROCEDURE — 84112 EVAL AMNIOTIC FLUID PROTEIN: CPT | Performed by: NURSE PRACTITIONER

## 2020-07-08 PROCEDURE — 86901 BLOOD TYPING SEROLOGIC RH(D): CPT

## 2020-07-08 PROCEDURE — 12000000 HC ROOM AND CARE MED/SURG

## 2020-07-08 PROCEDURE — 25800000 HC PHARMACY IV SOLUTIONS: Performed by: ANESTHESIOLOGY

## 2020-07-08 PROCEDURE — 87510 GARDNER VAG DNA DIR PROBE: CPT | Performed by: OBSTETRICS & GYNECOLOGY

## 2020-07-08 PROCEDURE — 37000005 HC ANESTHESIA EPIDURAL/SPINAL

## 2020-07-08 PROCEDURE — 63600000 HC DRUGS/DETAIL CODE: Performed by: STUDENT IN AN ORGANIZED HEALTH CARE EDUCATION/TRAINING PROGRAM

## 2020-07-08 PROCEDURE — 86780 TREPONEMA PALLIDUM: CPT | Performed by: STUDENT IN AN ORGANIZED HEALTH CARE EDUCATION/TRAINING PROGRAM

## 2020-07-08 PROCEDURE — 25000000 HC PHARMACY GENERAL: Performed by: STUDENT IN AN ORGANIZED HEALTH CARE EDUCATION/TRAINING PROGRAM

## 2020-07-08 PROCEDURE — 25000000 HC PHARMACY GENERAL: Performed by: ANESTHESIOLOGY

## 2020-07-08 PROCEDURE — 63700000 HC SELF-ADMINISTRABLE DRUG: Performed by: OBSTETRICS & GYNECOLOGY

## 2020-07-08 PROCEDURE — 72000011 HC VAGINAL DELIVERY LEVEL 1

## 2020-07-08 PROCEDURE — 25800000 HC PHARMACY IV SOLUTIONS: Performed by: STUDENT IN AN ORGANIZED HEALTH CARE EDUCATION/TRAINING PROGRAM

## 2020-07-08 PROCEDURE — 85027 COMPLETE CBC AUTOMATED: CPT | Performed by: STUDENT IN AN ORGANIZED HEALTH CARE EDUCATION/TRAINING PROGRAM

## 2020-07-08 PROCEDURE — 63600000 HC DRUGS/DETAIL CODE: Performed by: ANESTHESIOLOGY

## 2020-07-08 PROCEDURE — U0002 COVID-19 LAB TEST NON-CDC: HCPCS | Performed by: NURSE PRACTITIONER

## 2020-07-08 PROCEDURE — 0KQM0ZZ REPAIR PERINEUM MUSCLE, OPEN APPROACH: ICD-10-PCS | Performed by: OBSTETRICS & GYNECOLOGY

## 2020-07-08 PROCEDURE — 36415 COLL VENOUS BLD VENIPUNCTURE: CPT | Performed by: STUDENT IN AN ORGANIZED HEALTH CARE EDUCATION/TRAINING PROGRAM

## 2020-07-08 RX ORDER — CALCIUM CARBONATE 200(500)MG
500 TABLET,CHEWABLE ORAL EVERY 4 HOURS PRN
Status: DISCONTINUED | OUTPATIENT
Start: 2020-07-08 | End: 2020-07-10 | Stop reason: HOSPADM

## 2020-07-08 RX ORDER — SODIUM CHLORIDE, SODIUM LACTATE, POTASSIUM CHLORIDE, CALCIUM CHLORIDE 600; 310; 30; 20 MG/100ML; MG/100ML; MG/100ML; MG/100ML
125 INJECTION, SOLUTION INTRAVENOUS CONTINUOUS
Status: ACTIVE | OUTPATIENT
Start: 2020-07-08 | End: 2020-07-08

## 2020-07-08 RX ORDER — OXYTOCIN/0.9 % SODIUM CHLORIDE 20/1000 ML
500 PLASTIC BAG, INJECTION (ML) INTRAVENOUS ONCE
Status: COMPLETED | OUTPATIENT
Start: 2020-07-08 | End: 2020-07-08

## 2020-07-08 RX ORDER — TRANEXAMIC ACID 10 MG/ML
1000 INJECTION, SOLUTION INTRAVENOUS ONCE AS NEEDED
Status: CANCELLED | OUTPATIENT
Start: 2020-07-08

## 2020-07-08 RX ORDER — AMOXICILLIN 250 MG
1 CAPSULE ORAL 2 TIMES DAILY
Status: DISCONTINUED | OUTPATIENT
Start: 2020-07-08 | End: 2020-07-10 | Stop reason: HOSPADM

## 2020-07-08 RX ORDER — OXYCODONE HYDROCHLORIDE 5 MG/1
5 TABLET ORAL EVERY 4 HOURS PRN
Status: DISCONTINUED | OUTPATIENT
Start: 2020-07-08 | End: 2020-07-10 | Stop reason: HOSPADM

## 2020-07-08 RX ORDER — OXYTOCIN 10 [USP'U]/ML
10 INJECTION, SOLUTION INTRAMUSCULAR; INTRAVENOUS ONCE AS NEEDED
Status: CANCELLED | OUTPATIENT
Start: 2020-07-08

## 2020-07-08 RX ORDER — DIPHENHYDRAMINE HCL 50 MG/ML
25 VIAL (ML) INJECTION EVERY 6 HOURS PRN
Status: DISCONTINUED | OUTPATIENT
Start: 2020-07-08 | End: 2020-07-09

## 2020-07-08 RX ORDER — LIDOCAINE HYDROCHLORIDE 10 MG/ML
INJECTION, SOLUTION EPIDURAL; INFILTRATION; INTRACAUDAL; PERINEURAL AS NEEDED
Status: DISCONTINUED | OUTPATIENT
Start: 2020-07-08 | End: 2020-07-08 | Stop reason: SURG

## 2020-07-08 RX ORDER — DIBUCAINE 1 %
1 OINTMENT (GRAM) TOPICAL AS NEEDED
Status: DISCONTINUED | OUTPATIENT
Start: 2020-07-08 | End: 2020-07-10 | Stop reason: HOSPADM

## 2020-07-08 RX ORDER — MISOPROSTOL 200 UG/1
1000 TABLET ORAL ONCE AS NEEDED
Status: CANCELLED | OUTPATIENT
Start: 2020-07-08

## 2020-07-08 RX ORDER — ONDANSETRON 4 MG/1
4 TABLET, ORALLY DISINTEGRATING ORAL EVERY 8 HOURS PRN
Status: DISCONTINUED | OUTPATIENT
Start: 2020-07-08 | End: 2020-07-10 | Stop reason: HOSPADM

## 2020-07-08 RX ORDER — LIDOCAINE HYDROCHLORIDE AND EPINEPHRINE 15; 5 MG/ML; UG/ML
INJECTION, SOLUTION EPIDURAL
Status: COMPLETED | OUTPATIENT
Start: 2020-07-08 | End: 2020-07-08

## 2020-07-08 RX ORDER — LIDOCAINE HYDROCHLORIDE 10 MG/ML
0-30 INJECTION, SOLUTION EPIDURAL; INFILTRATION; INTRACAUDAL; PERINEURAL ONCE AS NEEDED
Status: CANCELLED | OUTPATIENT
Start: 2020-07-08

## 2020-07-08 RX ORDER — OXYTOCIN/0.9 % SODIUM CHLORIDE 20/1000 ML
PLASTIC BAG, INJECTION (ML) INTRAVENOUS CONTINUOUS
Status: DISCONTINUED | OUTPATIENT
Start: 2020-07-08 | End: 2020-07-09

## 2020-07-08 RX ORDER — IBUPROFEN 600 MG/1
600 TABLET ORAL EVERY 6 HOURS PRN
Status: DISCONTINUED | OUTPATIENT
Start: 2020-07-08 | End: 2020-07-10 | Stop reason: HOSPADM

## 2020-07-08 RX ORDER — OXYTOCIN/0.9 % SODIUM CHLORIDE 30/500 ML
0-20 PLASTIC BAG, INJECTION (ML) INTRAVENOUS CONTINUOUS
Status: DISCONTINUED | OUTPATIENT
Start: 2020-07-08 | End: 2020-07-09

## 2020-07-08 RX ORDER — ONDANSETRON HYDROCHLORIDE 2 MG/ML
4 INJECTION, SOLUTION INTRAVENOUS EVERY 8 HOURS PRN
Status: DISCONTINUED | OUTPATIENT
Start: 2020-07-08 | End: 2020-07-10 | Stop reason: HOSPADM

## 2020-07-08 RX ORDER — CARBOPROST TROMETHAMINE 250 UG/ML
250 INJECTION, SOLUTION INTRAMUSCULAR ONCE AS NEEDED
Status: CANCELLED | OUTPATIENT
Start: 2020-07-08

## 2020-07-08 RX ORDER — METHYLERGONOVINE MALEATE 0.2 MG/ML
200 INJECTION INTRAVENOUS ONCE AS NEEDED
Status: CANCELLED | OUTPATIENT
Start: 2020-07-08

## 2020-07-08 RX ORDER — DIPHENHYDRAMINE HCL 25 MG
25 CAPSULE ORAL EVERY 6 HOURS PRN
Status: DISCONTINUED | OUTPATIENT
Start: 2020-07-08 | End: 2020-07-10 | Stop reason: HOSPADM

## 2020-07-08 RX ORDER — ALUMINUM HYDROXIDE, MAGNESIUM HYDROXIDE, AND SIMETHICONE 1200; 120; 1200 MG/30ML; MG/30ML; MG/30ML
30 SUSPENSION ORAL EVERY 4 HOURS PRN
Status: DISCONTINUED | OUTPATIENT
Start: 2020-07-08 | End: 2020-07-09

## 2020-07-08 RX ORDER — ACETAMINOPHEN 325 MG/1
650 TABLET ORAL EVERY 4 HOURS PRN
Status: DISCONTINUED | OUTPATIENT
Start: 2020-07-08 | End: 2020-07-10 | Stop reason: HOSPADM

## 2020-07-08 RX ORDER — SODIUM CHLORIDE, SODIUM LACTATE, POTASSIUM CHLORIDE, CALCIUM CHLORIDE 600; 310; 30; 20 MG/100ML; MG/100ML; MG/100ML; MG/100ML
125 INJECTION, SOLUTION INTRAVENOUS CONTINUOUS
Status: DISCONTINUED | OUTPATIENT
Start: 2020-07-08 | End: 2020-07-09

## 2020-07-08 RX ADMIN — LIDOCAINE HYDROCHLORIDE,EPINEPHRINE BITARTRATE 5 ML: 15; .005 INJECTION, SOLUTION EPIDURAL; INFILTRATION; INTRACAUDAL; PERINEURAL at 14:50

## 2020-07-08 RX ADMIN — Medication 10 UNITS: at 20:50

## 2020-07-08 RX ADMIN — ROPIVACAINE HYDROCHLORIDE 50 ML: 10 INJECTION, SOLUTION EPIDURAL at 18:32

## 2020-07-08 RX ADMIN — LIDOCAINE HYDROCHLORIDE 5 ML: 10 INJECTION, SOLUTION EPIDURAL; INFILTRATION; INTRACAUDAL; PERINEURAL at 14:59

## 2020-07-08 RX ADMIN — AMPICILLIN SODIUM 2 G: 2 INJECTION, POWDER, FOR SOLUTION INTRAMUSCULAR; INTRAVENOUS at 09:27

## 2020-07-08 RX ADMIN — OXYTOCIN-SODIUM CHLORIDE 0.9% IV SOLN 30 UNIT/500ML 2 MILLI-UNITS/MIN: 30-0.9/5 SOLUTION at 10:40

## 2020-07-08 RX ADMIN — SODIUM CHLORIDE, POTASSIUM CHLORIDE, SODIUM LACTATE AND CALCIUM CHLORIDE 1000 ML: 600; 310; 30; 20 INJECTION, SOLUTION INTRAVENOUS at 10:33

## 2020-07-08 RX ADMIN — AMPICILLIN SODIUM 1 G: 1 INJECTION, POWDER, FOR SOLUTION INTRAMUSCULAR; INTRAVENOUS at 13:30

## 2020-07-08 RX ADMIN — IBUPROFEN 600 MG: 600 TABLET ORAL at 23:56

## 2020-07-08 RX ADMIN — ROPIVACAINE HYDROCHLORIDE 50 ML: 10 INJECTION, SOLUTION EPIDURAL at 15:25

## 2020-07-08 RX ADMIN — Medication: at 21:30

## 2020-07-08 RX ADMIN — AMPICILLIN SODIUM 1 G: 1 INJECTION, POWDER, FOR SOLUTION INTRAMUSCULAR; INTRAVENOUS at 17:35

## 2020-07-08 SDOH — ECONOMIC STABILITY: FOOD INSECURITY: HOW HARD IS IT FOR YOU TO PAY FOR THE VERY BASICS LIKE FOOD, HOUSING, MEDICAL CARE, AND HEATING?: PATIENT DECLINED

## 2020-07-08 SDOH — ECONOMIC STABILITY: FOOD INSECURITY
WITHIN THE PAST 12 MONTHS, YOU WORRIED THAT YOUR FOOD WOULD RUN OUT BEFORE YOU GOT THE MONEY TO BUY MORE.: PATIENT DECLINED

## 2020-07-08 SDOH — ECONOMIC STABILITY: FOOD INSECURITY: WITHIN THE PAST 12 MONTHS, THE FOOD YOU BOUGHT JUST DIDN'T LAST AND YOU DIDN'T HAVE MONEY TO GET MORE.: PATIENT DECLINED

## 2020-07-08 SDOH — ECONOMIC STABILITY: TRANSPORTATION INSECURITY
IN THE PAST 12 MONTHS, HAS LACK OF TRANSPORTATION KEPT YOU FROM MEDICAL APPOINTMENTS OR FROM GETTING MEDICATIONS?: PATIENT DECLINED

## 2020-07-08 ASSESSMENT — ACTIVITIES OF DAILY LIVING (ADL): LACK_OF_TRANSPORTATION: PATIENT DECLINED

## 2020-07-08 NOTE — ANESTHESIA PROCEDURE NOTES
Epidural Block    Patient location during procedure: OB  Start time: 7/8/2020 2:25 PM  End time: 7/8/2020 2:55 PM  Reason for block: labor analgesia requested by patient and obstetrician  Staffing  Anesthesiologist: Rodríguez Hernandez MD  Performed: anesthesiologist   Preanesthetic Checklist  Completed: patient identified, surgical consent, pre-op evaluation, timeout performed, IV checked, risks and benefits discussed, monitors and equipment checked, anesthesia consent given and sterile field maintained during procedure  Epidural  Patient position: sitting  Prep: ChloraPrep and site prepped and draped  Patient monitoring: heart rate, cardiac monitor, continuous pulse ox and blood pressure  Approach: midline  Location: lumbar (1-5)  Injection technique: KEISHA saline  Needle  Needle type: Tuohy   Needle gauge: 17 G  Needle length: 3.5 in  Catheter type: Single orifice  Catheter size: 19 G  Catheter at skin depth: 5 cm  Test dose: negative and lidocaine 1.5% with epinephrine 1-to-200,000  Assessment  Sensory level: T4  Additional Notes  Procedure well tolerated. Vital signs stable. No paresthesia.  Analgesia satisfactory. Patients nurse to notify anesthesiologist if hemodynamically unstable.    Medications Administered - 7/8/2020 2:50 PM   Lidocaine 1.5%-EPINEPHrine 1:200,000 PF (XYLOCAINE W/EPI) injection, 5 mL

## 2020-07-08 NOTE — PROGRESS NOTES
Labor and Delivery Progress Note    Subjective     Patient comfortable w/o epidural.    Objective     Temp:  [36.5 °C (97.7 °F)-37 °C (98.6 °F)] 36.9 °C (98.4 °F)  Heart Rate:  [65-88] 65  Resp:  [16-18] 16  BP: (107-126)/(66-78) 126/70    Fetal Monitoring:  FHR Baseline: 145  FHR Variability: moderate  FHR Accelerations: present  FHR Decelerations: absent    Contraction Frequency: infrequent     Latest cervical exam:  Cervical Dilation (cm): 2  Cervical Effacement: 60  Fetal Station: -3  Method: sterile exam per physician(Dr. Santos) (20 3277)    Assessment/Plan     Grace Mobley is a 34 y.o.  at 39w1d admitted for IOL 2/2 SROM/TOLAC.    1. FHR: Category I  2. GBS: positive, ampicillin for GBS prophylaxis   3. IOL/TOLAC: Pitocin is titrated to 8. Will con't to titrate pitocin per protocol. Will continue to monitor FHR tracings and TOCO. Will recheck in 2-3 or PRN.    D/w Dr. Denny.    Johnathan Sandhu DO

## 2020-07-08 NOTE — PROGRESS NOTES
"S: comfortable with epidural    O:   Visit Vitals  /67   Pulse 83   Temp 36.9 °C (98.4 °F) (Oral)   Resp 16   Ht 1.626 m (5' 4\")   Wt 64.9 kg (143 lb)   SpO2 100%   Breastfeeding Yes   BMI 24.55 kg/m²     FHT: 150/mod ronny/+accels/-decels   TOCO: q2-4 min   CERVIX: deferred     A/P: 33 yo  @ 39 1/7 wks, IOL 2/2 PROM, TOLAC    1. FHT cat 1  2. GBS pos - continue amp  3. IOL, TOLAC - pitocin at 10, will plan to recheck cervix in about one hour, FHT remains reassuring and patient would like to continue with TOLAC    Swati Denny MD   "

## 2020-07-08 NOTE — PROGRESS NOTES
Labor and Delivery Progress Note    Subjective     Patient comfortable with epidural.    Objective     Temp:  [36.5 °C (97.7 °F)-37 °C (98.6 °F)] 37 °C (98.6 °F)  Heart Rate:  [70-88] 70  Resp:  [16-18] 16  BP: (107-123)/(66-78) 123/78    Fetal Monitoring:  FHR Baseline: 140  FHR Variability: moderate  FHR Accelerations: present   FHR Decelerations: absent    Contraction Frequency: q2-4min    Latest cervical exam:  Cervical Dilation (cm): 1  Cervical Effacement: 60  Fetal Station: -3  Method: sterile exam per physician (20 0936)    Assessment/Plan     Grace Mobley is a 34 y.o.  at 39w1d admitted for  IOL 2/2 SROM/TOLAC.    1. FHR: Category I  2. GBS: positive, ampicillin for prophylaxis  3. IOL/TOLAC: Pitocin is titrated to 6. Will con't to titrate pitocin per protocol. Will continue to monitor FHR tracings and TOCO. Will recheck in 2-3 or PRN.      Johnathan Sandhu DO

## 2020-07-08 NOTE — ANESTHESIOLOGIST PRE-PROCEDURE ATTESTATION
Pre-Procedure Patient Identification:  I am the Primary Anesthesiologist and have identified the patient on 07/08/20 at 3:04 PM.   I have confirmed the following procedure(s) Labor epidural will be performed by the following surgeon/proceduralist Rodríguez Hernandez

## 2020-07-08 NOTE — PROGRESS NOTES
"S: comfortable with epidural     O:   Visit Vitals  /62   Pulse 69   Temp 36.6 °C (97.8 °F) (Oral)   Resp 16   Ht 1.626 m (5' 4\")   Wt 64.9 kg (143 lb)   SpO2 100%   Breastfeeding Yes   BMI 24.55 kg/m²     FHT: 150/mod ronny/+accels/+2 min decel with Castillo catheter placement   TOCO: q2-3 min  CERVIX: 4/80/-1     A/P: 33 yo  @ 39 1/7 wks, TOLAC, IOL 2/2 PROM    1. FHT cat 1 aside for a 2 min decel with Castillo catheter placement  2. GBS pos- cont amp  3. IOL 2/2 PROM, TOLAC - cervical change, vertex lower, will keep pitocin at 10 and continue induction, FHT remains reassuring     Swati Denny MD   "

## 2020-07-08 NOTE — ANESTHESIA PREPROCEDURE EVALUATION
Anesthesia ROS/MED HX    Anesthesia History - neg  Pulmonary    asthma  Neuro/Psych - neg  Cardiovascular- neg  Hematological - neg  GI/Hepatic- neg  Musculoskeletal- neg  Renal Disease- neg  Endo/Other- neg  ROS/MED HX Comments:    Anesthesia History: TOLAC      Relevant Problems   No relevant active problems       Physical Exam    Airway   Mallampati: II   TM distance: <3 FB   Neck ROM: full  Cardiovascular - normal   Rhythm: regular   Rate: normalPulmonary - normal   clear to auscultation  Other Findings   Back - neg   landmarks identified          Anesthesia Plan    Plan: labor epidural    Technique: epidural   ASA 2  Anesthetic plan and risks discussed with: patient and significant other

## 2020-07-08 NOTE — H&P
HPI     Grace Mobley is a 34 y.o.  at 39w1d who presents with leakage of fluid. Per patient she felt a gush of clear fluid without smell at 2:40am. She saturated a pad on her way to the hospital and reports that she was been continuing to feel fluid leak while in triage. She reports she also started feeling contractions at 11pm that have been occurring every 5-20mins. The patient denies VB. Reports +FM. The patient has a history of asthma for which she take albuterol prn, but has not needed to use inhaler in a few months. She has never been hospitalized for her asthma. She also has a h/o of anxiety but is not on meds.     This is the patient's second pregnancy. She had a LTCS 2/2 breech presentation in 2018 and now desires TOLAC.     The patient does not have any other pertinent medical, surgical or gynecological history.     OB History:   OB History    Para Term  AB Living   2 1 1 0 0 1   SAB TAB Ectopic Multiple Live Births   0 0 0 0 1      # Outcome Date GA Lbr Elijah/2nd Weight Sex Delivery Anes PTL Lv   2 Current            1 Term 18 39w2d  3.3 kg (7 lb 4.4 oz) F CS-LTranv EPI  BABITA      Name: RAJEEV MAYS      Apgar1: 8  Apgar5: 9        GYN History: Denies history of fibroids, cysts, polyps, sexually transmitted infections and abnormal pap smears    Medical History:   Past Medical History:   Diagnosis Date   • Asthma    • Depression      Denies history of: kidney disease, liver disease, seizures, blood disorder, hypertension and diabetes    Surgical History:   Past Surgical History:   Procedure Laterality Date   •  SECTION     • LASIK     • WISDOM TOOTH EXTRACTION         Allergies: Ciprofloxacin; Latex, natural rubber; and Pineapple    Review of Systems  Pertinent items are noted in HPI.    Objective     Vital Signs for the last 24 hours:  Temp:  [36.8 °C (98.3 °F)] 36.8 °C (98.3 °F)  Heart Rate:  [83] 83  Resp:  [18] 18  BP: (116)/(76)  116/76    Latest cervical exam:  Cervical Dilation (cm): 1  Cervical Effacement: 60  Fetal Station: -3  Method: sterile exam per physician (20 0613)    Additional Tests:   Sterile Speculum exam: yes  Pooling: no   Nitrazine: negative  Ferning: negative  Thick, white discharge noted      Fetal Monitoring:  FHR Baseline: 135  FHR Variability: Moderate  FHR Accelerations: present  FHR Decelerations: absent    Contraction Frequency: q8min    Exam:  General appearance: alert, no acute distress  Cardiac: Normal rate, regular rhythm   Pulmonary: CTAB, no increased respiratory effort  Abdomen: gravid, nontender  Female genitalia: see cervical exam.  Extremities: no lower extremity edema    Bedside Ultrasounds:   cephalic  CARLOS 13.45cm     Labs:  · Blood type: O+   · Group B Strep: positive    Assessment/Plan     Grace Mobley is a 34 y.o.  at 39w1d for rule out SROM.     - R/o SROM: No pooling was noted on sterile speculum exam, nitrazine/ferning were negative, CARLOS is WNL. Given the patient's presentation and convincing narrative for SROM, will send ROM plus test and await results. Affirm is pending.   - FHT: Reactive  - GBS: positive    D/w Dr. Tracey Rios MD

## 2020-07-08 NOTE — PROGRESS NOTES
Labor and Delivery Progress Note    Subjective     Patient comfortable with epidural .    Objective     Temp:  [36.5 °C (97.7 °F)-37 °C (98.6 °F)] 36.9 °C (98.4 °F)  Heart Rate:  [64-97] 83  Resp:  [16-18] 16  BP: ()/(58-78) 104/67    Fetal Monitoring:  FHR Baseline: 150  FHR Variability: moderate  FHR Accelerations: present   FHR Decelerations: absent    Contraction Frequency: q2-3min    Latest cervical exam:  Cervical Dilation (cm): 2  Cervical Effacement: 60  Fetal Station: -3  Method: sterile exam per physician(Dr. Santos) (20 9368)    Assessment/Plan     Grace Mobley is a 34 y.o.  at 39w1d admitted for IOL 2/2 SROM/TOLAC .    1. FHR: Category I  2. GBS: positive, ampicillin for prophylaxis   3. IOL/TOLAC: Pitocin is titrated to 10. Will cont to titrate per protocol. Will recheck in 2-3 hours or PRN.      Johnathan Sandhu DO

## 2020-07-08 NOTE — PROGRESS NOTES
Labor and Delivery Progress Note    Subjective     Patient comfortable with epidural    Objective     Vital Signs for the last 24 hours:  Temp:  [36.5 °C (97.7 °F)-37 °C (98.6 °F)] 36.6 °C (97.8 °F)  Heart Rate:  [64-97] 69  Resp:  [16-18] 16  BP: ()/(58-78) 106/62     Fetal Monitoring:  FHR Baseline: 155  FHR Variability: moderate  FHR Accelerations: present  FHR Decelerations: recurrent variable     Contraction Frequency: q2-3min      Latest cervical exam:  Cervical Dilation (cm): 10  Cervical Effacement: 100  Fetal Station: 1  Method: sterile exam per physician(Dr. Collins) (20)    Vaginal Bleeding: bloody show (20)      Current Facility-Administered Medications:   •  [COMPLETED] ampicillin IVPB 2 g in 100 mL NSS vial in bag, 2 g, intravenous, Once, Stopped at 20 1000 **FOLLOWED BY** ampicillin IVPB 1 g in 100 mL NSS vial in bag, 1 g, intravenous, q4h INT, Samara Santos MD, Last Rate: 200 mL/hr at 20 173, 1 g at 20 173  •  fentaNYL-ROPivacaine-NaCl (PF) 2-0.15 mcg/mL-% PCEA syringe, , epidural, Continuous, Rodríguez Hernandez MD, 50 mL at 20 183  •  lactated ringer's bolus 500 mL, 500 mL, intravenous, PRN, Rodríguez Hernandez MD  •  [COMPLETED] lactated ringer's bolus 1,000 mL, 1,000 mL, intravenous, Once, Stopped at 20 1058 **FOLLOWED BY** lactated ringer's infusion, 125 mL/hr, intravenous, Continuous, Samara Santos MD  •  [] lactated ringer's infusion, 125 mL/hr, intravenous, Continuous **AND** oxytocin in 0.9 % NSS (PITOCIN) 30 unit/500 mL infusion, 0-20 omero-units/min, intravenous, Continuous, Samara Santos MD, Last Rate: 6 mL/hr at 20, 6 omero-units/min at 20 1852    Facility-Administered Medications Ordered in Other Encounters:   •  lidocaine PF (XYLOCAINE) 10 mg/mL (1 %) injection, , , PRN, Rodríguez Hernandez MD, 5 mL at 20 1459    Assessment/Plan     Graceradha Mobley is a 34 y.o. female  at 39w1d admitted with  induction of labor 2/2 PROM, TOLAC     1. FHR: Category II  2. GBS: positive on ampicillin  3. IOL: patient on pitocin at 12, now with deep recurrent variables to 90. reassured by quick cervical change. Will decrease pitocin to 6 and set up to start pushing. Reassured with moderate variability. Would have low threshold for operative delivery if FHT continue to have deep variables with pushing.     Plan of care discussed with Dr. Eduin Collins MD

## 2020-07-08 NOTE — RESULT ENCOUNTER NOTE
Patient is admitted at [facility] on Labor and Delivery. COVID-19 negative, there is no infection banner present in the patient header.

## 2020-07-08 NOTE — PLAN OF CARE
Problem: Adult Inpatient Plan of Care  Goal: Plan of Care Review  Outcome: Progressing  Goal: Patient-Specific Goal (Individualization)  Outcome: Progressing  Flowsheets (Taken 7/8/2020 2396)  Individualized Care Needs: prefers vaginal birth  Anxieties, Fears or Concerns: fear of TOLAC  Goal: Absence of Hospital-Acquired Illness or Injury  Outcome: Progressing  Goal: Optimal Comfort and Wellbeing  Outcome: Progressing  Goal: Readiness for Transition of Care  Outcome: Progressing  Goal: Rounds/Family Conference  Outcome: Progressing     Problem: Bleeding (Labor)  Goal: Hemostasis  Outcome: Progressing     Problem: Change in Fetal Wellbeing (Labor)  Goal: Stable Fetal Wellbeing  Outcome: Progressing     Problem: Delayed Labor Progression (Labor)  Goal: Effective Progression to Delivery  Outcome: Progressing     Problem: Infection (Labor)  Goal: Absence of Infection Signs/Symptoms  Outcome: Progressing     Problem: Labor Pain (Labor)  Goal: Acceptable Pain Control  Outcome: Progressing     Problem: Uterine Tachysystole (Labor)  Goal: Normal Uterine Contraction Pattern  Outcome: Progressing

## 2020-07-09 LAB
ERYTHROCYTE [DISTWIDTH] IN BLOOD BY AUTOMATED COUNT: 13.2 % (ref 11.7–14.4)
HCT VFR BLDCO AUTO: 31.2 % (ref 35–45)
HGB BLD-MCNC: 10.4 G/DL (ref 11.8–15.7)
MCH RBC QN AUTO: 29.5 PG (ref 28–33.2)
MCHC RBC AUTO-ENTMCNC: 33.3 G/DL (ref 32.2–35.5)
MCV RBC AUTO: 88.6 FL (ref 83–98)
PDW BLD AUTO: 11 FL (ref 9.4–12.3)
PLATELET # BLD AUTO: 236 K/UL (ref 150–369)
RBC # BLD AUTO: 3.52 M/UL (ref 3.93–5.22)
WBC # BLD AUTO: 16.94 K/UL (ref 3.8–10.5)

## 2020-07-09 PROCEDURE — 85027 COMPLETE CBC AUTOMATED: CPT | Performed by: OBSTETRICS & GYNECOLOGY

## 2020-07-09 PROCEDURE — 12000000 HC ROOM AND CARE MED/SURG

## 2020-07-09 PROCEDURE — 63700000 HC SELF-ADMINISTRABLE DRUG: Performed by: OBSTETRICS & GYNECOLOGY

## 2020-07-09 PROCEDURE — 36415 COLL VENOUS BLD VENIPUNCTURE: CPT | Performed by: OBSTETRICS & GYNECOLOGY

## 2020-07-09 RX ORDER — ACETAMINOPHEN 325 MG/1
975 TABLET ORAL EVERY 8 HOURS PRN
Qty: 30 TABLET | Refills: 1
Start: 2020-07-09 | End: 2020-08-08

## 2020-07-09 RX ORDER — IBUPROFEN 600 MG/1
600 TABLET ORAL EVERY 6 HOURS PRN
Qty: 30 TABLET | Refills: 1 | Status: SHIPPED | OUTPATIENT
Start: 2020-07-09 | End: 2022-11-18

## 2020-07-09 RX ORDER — IBUPROFEN 600 MG/1
600 TABLET ORAL EVERY 6 HOURS PRN
Qty: 30 TABLET | Refills: 1 | Status: SHIPPED | OUTPATIENT
Start: 2020-07-09 | End: 2023-01-16

## 2020-07-09 RX ADMIN — ACETAMINOPHEN 650 MG: 325 TABLET, FILM COATED ORAL at 23:42

## 2020-07-09 RX ADMIN — DOCUSATE SODIUM AND SENNOSIDES 1 TABLET: 8.6; 5 TABLET, FILM COATED ORAL at 01:28

## 2020-07-09 RX ADMIN — IBUPROFEN 600 MG: 600 TABLET ORAL at 14:46

## 2020-07-09 RX ADMIN — IBUPROFEN 600 MG: 600 TABLET ORAL at 08:34

## 2020-07-09 RX ADMIN — ACETAMINOPHEN 650 MG: 325 TABLET, FILM COATED ORAL at 12:43

## 2020-07-09 RX ADMIN — ACETAMINOPHEN 650 MG: 325 TABLET, FILM COATED ORAL at 18:56

## 2020-07-09 RX ADMIN — DOCUSATE SODIUM AND SENNOSIDES 1 TABLET: 8.6; 5 TABLET, FILM COATED ORAL at 20:32

## 2020-07-09 RX ADMIN — DIBUCAINE 1 APPLICATION.: 1 OINTMENT TOPICAL at 01:29

## 2020-07-09 RX ADMIN — PRENATAL VITAMINS-IRON FUMARATE 27 MG IRON-FOLIC ACID 0.8 MG TABLET 1 TABLET: at 08:34

## 2020-07-09 RX ADMIN — IBUPROFEN 600 MG: 600 TABLET ORAL at 20:32

## 2020-07-09 RX ADMIN — DOCUSATE SODIUM AND SENNOSIDES 1 TABLET: 8.6; 5 TABLET, FILM COATED ORAL at 08:34

## 2020-07-09 RX ADMIN — BENZOCAINE AND LEVOMENTHOL: 200; 5 SPRAY TOPICAL at 01:28

## 2020-07-09 NOTE — PLAN OF CARE
Problem: Adult Inpatient Plan of Care  Goal: Plan of Care Review  Outcome: Progressing  Flowsheets (Taken 7/9/2020 1233)  Progress: improving  Plan of Care Reviewed With: patient  Outcome Summary: Pt. reports adequate pain control. Breastfeeding on demand.

## 2020-07-09 NOTE — DISCHARGE SUMMARY
Inpatient Discharge Summary    BRIEF OVERVIEW  Admitting Provider: Swati Denny MD  Discharge Provider: Eileen Rojas DO  Primary Care Physician at Discharge: Pt States, No Pcp     Admission Date: 2020     Discharge Date: 7/10/2020    Primary Discharge Diagnosis  Term Pregnancy--Delivered    Secondary Discharge Diagnosis  Prior C/S--    Discharge Disposition  Home     Discharge Medications     Medication List      START taking these medications    acetaminophen 325 mg tablet  Commonly known as:  TYLENOL  Take 3 tablets (975 mg total) by mouth every 8 (eight) hours as needed for mild pain, moderate pain, headaches or fever.  Dose:  975 mg     * ibuprofen 600 mg tablet  Commonly known as:  MOTRIN  Take 1 tablet (600 mg total) by mouth every 6 (six) hours as needed for mild pain, moderate pain, fever or headaches.  Dose:  600 mg            * This list has 2 medication(s) that are the same as other medications prescribed for you. Read the directions carefully, and ask your doctor or other care provider to review them with you.            CONTINUE taking these medications    prenatal vit-iron fum-folic ac 27 mg iron- 0.8 mg tablet tablet  Take 1 tablet by mouth daily.  Dose:  1 tablet            Active Issues Requiring Follow-up: none      Test Results Pending at Discharge: none      DETAILS OF HOSPITAL STAY    Presenting Problem/History of Present Illness  Delivery with history of  [O34.219]  Encounter for induction of labor [Z34.90]      Hospital Course/Operative Procedures Performed  Procedure(s): , Repair of Laceration  Consults: none  Procedures: as noted  Pertinent Test Results: labs: cbc

## 2020-07-09 NOTE — PLAN OF CARE
Problem: Adult Inpatient Plan of Care  Goal: Plan of Care Review  Outcome: Progressing  Goal: Patient-Specific Goal (Individualization)  Outcome: Progressing  Flowsheets (Taken 7/8/2020 2121)  Individualized Care Needs: help with breast feeding  Anxieties, Fears or Concerns: nervous about breast feeding  Goal: Absence of Hospital-Acquired Illness or Injury  Outcome: Progressing  Goal: Optimal Comfort and Wellbeing  Outcome: Progressing  Goal: Readiness for Transition of Care  Outcome: Progressing  Goal: Rounds/Family Conference  Outcome: Progressing     Problem: Bleeding (Labor)  Goal: Hemostasis  Outcome: Progressing     Problem: Change in Fetal Wellbeing (Labor)  Goal: Stable Fetal Wellbeing  Outcome: Progressing     Problem: Delayed Labor Progression (Labor)  Goal: Effective Progression to Delivery  Outcome: Progressing     Problem: Infection (Labor)  Goal: Absence of Infection Signs/Symptoms  Outcome: Progressing     Problem: Labor Pain (Labor)  Goal: Acceptable Pain Control  Outcome: Progressing     Problem: Uterine Tachysystole (Labor)  Goal: Normal Uterine Contraction Pattern  Outcome: Progressing     Problem: Adjustment to Role Transition (Postpartum Vaginal Delivery)  Goal: Successful Maternal Role Transition  Outcome: Progressing     Problem: Bleeding (Postpartum Vaginal Delivery)  Goal: Hemostasis  Outcome: Progressing     Problem: Infection (Postpartum Vaginal Delivery)  Goal: Absence of Infection Signs/Symptoms  Outcome: Progressing     Problem: Pain (Postpartum Vaginal Delivery)  Goal: Acceptable Pain Control  Outcome: Progressing     Problem: Urinary Retention (Postpartum Vaginal Delivery)  Goal: Effective Urinary Elimination  Outcome: Progressing

## 2020-07-09 NOTE — LACTATION NOTE
Mom reports baby is BF well. Denies pain or discomfort with feeds. She successfully BF her first child and feels confident.     Reviewed typical  feeding patterns and expected milk production. Discussed the importance of frequent, on demand feeds or at least q2-3hrs. Mom aware to call for assist PRN.    She has a pump for home.

## 2020-07-09 NOTE — PROGRESS NOTES
Obstetrics Postpartum Progress Note    Events  Patient seen and examined. No acute events overnight.  Pt denies HA, CP, SOB, N/V and F/C. No complaints.    Subjective  Pain: well controlled  Bleeding: lochia minimal  Diet: taking regular diet  Bowel: passing flatus  Voiding: without difficulty  Ambulating: as tolerated    Vitals  Temp:  [36.5 °C (97.7 °F)-37 °C (98.6 °F)] 36.7 °C (98.1 °F)  Heart Rate:  [] 71  Resp:  [16-18] 18  BP: ()/(53-81) 101/53    Physical Exam  General: well  Heart: Regular rate and rhythm  Lungs: Clear to auscultation bilaterally  Abdomen: soft, nondistended, non-tender  Fundus: firm and below umbilicus  Extremities: no edema   Neuro: +2 brachioradialis reflexes b/l    Labs  Labs Reviewed:  Lab Results   Component Value Date    ABO O 2020    LABRH Positive 2020      CBC Results       20                    0926 0556 0900         WBC 15.24 13.11 11.23         RBC 4.38 3.24 4.42         HGB 13.2 9.7 13.1         HCT 38.7 29.2 37.7         MCV 88.4 90.1 85.3         MCH 30.1 29.9 29.6         MCHC 34.1 33.2 34.7          213 278                     Rubella: immune    Assessment/Plan   Problem-based Assessment and Plan    Grace Mobley is a 34 y.o.  PPD#1 s/p     1. Vital Signs: stable  2. Hemodynamics: CBC pending Hgb 13.2 upon admission. No signs or symptoms of acute anemia.  3. Pain: controlled with Tylenol and motrin.   4. VTE Assessment: Early Ambulation, SCDs  5. Vaccinations/Rhogam: rhogam not indicated   6. Post care: meeting all goals Continue routine post partum care.    Johnathan Sandhu DO   normal mood and affect. no apparent risk to self or others.

## 2020-07-09 NOTE — ANESTHESIA POSTPROCEDURE EVALUATION
Patient: Grace Mobley    Procedure Summary     Date:  07/08/20 Room / Location:      Anesthesia Start:  1437 Anesthesia Stop:  2007    Procedure:  Labor Analgesia Diagnosis:      Scheduled Providers:   Responsible Provider:  Anatoliy Starr MD    Anesthesia Type:  epidural ASA Status:  2          Anesthesia Type: epidural  PACU Vitals     No data found in the last 10 encounters.            Anesthesia Post Evaluation    Pain management: satisfactory to patient  Mode of pain management: IV medication  Patient location during evaluation: floor  Patient participation: complete - patient participated  Level of consciousness: awake and alert  Cardiovascular status: acceptable and hemodynamically stable  Airway Patency: adequate  Respiratory status: acceptable  Hydration status: stable  Anesthetic complications: no

## 2020-07-10 VITALS
OXYGEN SATURATION: 98 % | WEIGHT: 136.1 LBS | TEMPERATURE: 98.8 F | SYSTOLIC BLOOD PRESSURE: 121 MMHG | RESPIRATION RATE: 16 BRPM | HEART RATE: 67 BPM | BODY MASS INDEX: 23.23 KG/M2 | DIASTOLIC BLOOD PRESSURE: 75 MMHG | HEIGHT: 64 IN

## 2020-07-10 PROCEDURE — 63700000 HC SELF-ADMINISTRABLE DRUG: Performed by: OBSTETRICS & GYNECOLOGY

## 2020-07-10 RX ADMIN — DOCUSATE SODIUM AND SENNOSIDES 1 TABLET: 8.6; 5 TABLET, FILM COATED ORAL at 09:30

## 2020-07-10 RX ADMIN — IBUPROFEN 600 MG: 600 TABLET ORAL at 09:30

## 2020-07-10 RX ADMIN — DIBUCAINE 1 APPLICATION.: 1 OINTMENT TOPICAL at 07:51

## 2020-07-10 RX ADMIN — PRENATAL VITAMINS-IRON FUMARATE 27 MG IRON-FOLIC ACID 0.8 MG TABLET 1 TABLET: at 09:30

## 2020-07-10 RX ADMIN — IBUPROFEN 600 MG: 600 TABLET ORAL at 03:27

## 2020-07-10 NOTE — PROGRESS NOTES
Obstetrics Postpartum Progress Note    Events  Patient seen and examined. No acute events overnight.  Pt denies HA, CP, SOB, N/V and F/C. No complaints.    Subjective  Pain: well controlled  Bleeding: lochia minimal  Diet: taking regular diet  Bowel: passing flatus  Voiding: without difficulty  Ambulating: as tolerated    Vitals  Temp:  [36.7 °C (98 °F)-36.8 °C (98.2 °F)] 36.8 °C (98.2 °F)  Heart Rate:  [64-80] 71  Resp:  [16-18] 18  BP: (112-114)/(56-82) 113/56    Physical Exam  General: well  Heart: Regular rate and rhythm  Lungs: Clear to auscultation bilaterally  Abdomen: soft, nondistended, non-tender  Fundus: firm and below umbilicus  Extremities: no edema   Neuro: +2 brachioradialis reflexes b/l      Labs  Labs Reviewed:  Lab Results   Component Value Date    ABO O 2020    LABRH Positive 2020      CBC Results       20                    0547 0926 0556         WBC 16.94 15.24 13.11         RBC 3.52 4.38 3.24         HGB 10.4 13.2 9.7         HCT 31.2 38.7 29.2         MCV 88.6 88.4 90.1         MCH 29.5 30.1 29.9         MCHC 33.3 34.1 33.2          287 213         Comment for HGB at 0547 on 20:  RESULT CHECKED        Rubella: immune    Assessment/Plan   Problem-based Assessment and Plan    Grace Mobley is a 34 y.o.  PPD#2 s/p      1. Vital Signs: stable  2. Hemodynamics: stable Hgb 13.2-->10.4. No signs or symptoms of acute anemia.  3. Pain: controlled with tylenol and motrin   4. VTE Assessment: Early Ambulation, SCDs  5. Vaccinations/Rhogam: rhogam not indicated   6. Post care: meeting all goals Continue routine post partum care.    Johnathan Sandhu DO

## 2020-07-10 NOTE — PLAN OF CARE
Problem: Adult Inpatient Plan of Care  Goal: Plan of Care Review  Outcome: Progressing  Flowsheets (Taken 7/10/2020 1020)  Progress: improving  Plan of Care Reviewed With: patient; spouse  Outcome Summary: Mom is recovering well from vaginal delivery. VSS, maternal checks WNL, pain well-controlled, bonding with baby. Will review teaching for discharge home today.     Problem: Breastfeeding  Goal: Effective Breastfeeding  Outcome: Progressing   Mom is breastfeeding well and independently. Aware of resources for support after discharge.

## 2020-07-12 NOTE — L&D DELIVERY NOTE
OB Vaginal Delivery Note     Delivery:2020 at 8:07 PM   Patient:Grace Mobley  :1985     Review the Delivery Report for details.      Delivery Details     Pre-Op Diagnosis: 1. 34 y.o.  intrauterine pregnancy at 39w1d with vickers gestation.  2. GBS positive  3. Trial of labor after     Post-Op Diagnosis: 1. Same as above   2. Delivery of viable female   3. Second degree perineal laceration  4. Left vaginal laceration    Delivery Clinician: Swati Denny    Delivery Assist  Delivery Nurse  Nursery Nurse  Hanh Hu    Delivery Type: , Spontaneous    Labor Complications: None    EBL: 300 mL   Anesthesia Type: Epidural    Placenta Delivery Spontaneous    Placenta Disposition: discarded    Additional Specimens Cord Blood      INFANT INFORMATION  Time of Birth:8:07 PM   Presentation: Vertex   Position:Left ,Occiput ,Anterior   Cord: 3 vessels ,Complications:Nuchal    Sex: female   Hornsby Weight:          1 Minute 5 Minute   Apgar Totals: 9    9         Grace Mobley is a 34 y.o.  at 39w1d gestation who presented to the hospital with spontaneous rupture of membranes for clear fluid, and was admitted for management of premature rupture of membranes. She desired a trial of labor after  and was extensively counseled in the pregnancy regarding risks. She signed consents for a  and for TOLAC. She did not make cervical change over several exams, so pitocin was used for augmentation. She had a category 1 fetal heart tracing throughout the day and progressed to complete dilation. She pushed for one hour, and with both resident and attending at the bedside, the patient delivered a viable female  from the left occiput anterior position at 19:04 hours. Upon delivery of the head, there was loose nuchal cord noted that was easily reduced. The anterior shoulder, which was the right, delivered with  ease, and the posterior shoulder followed.  Upon delivery of the infant, a spontaneous cry was heard, and the infant appeared to be moving all four extremities. Tactile stimulation and bulb suction were performed. The infant was placed on the maternal abdomen under direct nursing supervision. The umbilical cord was clamped twice after 60 seconds and cut in between.     Attention was then turned to the placenta, which was delivered spontaneously shortly thereafter. It was inspected and felt to be complete with a three vessel cord. Upon thorough inspection of the vagina, perineum, and cervix, there was a 2nd degree perineal laceration and a left vaginal laceration noted in need of repair.  A 2-0 and 3-0 vicryl suture were used to make the repair in the usual running fashion for the second degree perineal laceration. A 3-0 vicryl suture was used to repair vaginal laceration in an interrupted fashion. Upon completion of the repair, there was an excellent hemostatic and cosmetic result. At the completion of the case, all sponges, needles and instruments were removed from the vagina. All sponge and needle counts were found to be correct.  The uterine fundus was massaged, felt to be firm, and lochia was within normal limits. The patient tolerated the procedure well and was left with the infant in the delivery room to recover in stable condition.     Dr. Denny was present and scrubbed for the delivery and repair.      Hanh Rios MD    I agree with the operative report and have made any necessary changes. I was present and scrubbed for the entire procedure.    Swati Denny MD

## 2020-07-13 ENCOUNTER — HOSPITAL ENCOUNTER (OUTPATIENT)
Facility: HOSPITAL | Age: 35
Setting detail: SURGERY ADMIT
End: 2020-07-13
Attending: OBSTETRICS & GYNECOLOGY | Admitting: OBSTETRICS & GYNECOLOGY
Payer: COMMERCIAL

## 2021-04-08 DIAGNOSIS — Z23 ENCOUNTER FOR IMMUNIZATION: ICD-10-CM

## 2022-11-10 ENCOUNTER — TRANSCRIBE ORDERS (OUTPATIENT)
Dept: SCHEDULING | Age: 37
End: 2022-11-10

## 2022-11-10 DIAGNOSIS — R19.00 INTRA-ABDOMINAL AND PELVIC SWELLING, MASS AND LUMP, UNSPECIFIED SITE: Primary | ICD-10-CM

## 2022-11-23 ENCOUNTER — HOSPITAL ENCOUNTER (OUTPATIENT)
Dept: RADIOLOGY | Age: 37
Discharge: HOME | End: 2022-11-23
Attending: OBSTETRICS & GYNECOLOGY
Payer: COMMERCIAL

## 2022-11-23 DIAGNOSIS — R19.00 INTRA-ABDOMINAL AND PELVIC SWELLING, MASS AND LUMP, UNSPECIFIED SITE: ICD-10-CM

## 2022-11-23 RX ORDER — GADOBUTROL 604.72 MG/ML
5.4 INJECTION INTRAVENOUS ONCE
Status: COMPLETED | OUTPATIENT
Start: 2022-11-23 | End: 2022-11-23

## 2022-11-23 RX ADMIN — GADOBUTROL 5.4 ML: 604.72 INJECTION INTRAVENOUS at 08:20

## 2023-01-16 ENCOUNTER — OFFICE VISIT (OUTPATIENT)
Dept: SURGERY | Facility: CLINIC | Age: 38
End: 2023-01-16
Payer: COMMERCIAL

## 2023-01-16 VITALS
HEART RATE: 112 BPM | BODY MASS INDEX: 19.12 KG/M2 | SYSTOLIC BLOOD PRESSURE: 124 MMHG | WEIGHT: 112 LBS | DIASTOLIC BLOOD PRESSURE: 83 MMHG | HEIGHT: 64 IN

## 2023-01-16 DIAGNOSIS — R19.00 ABDOMINAL WALL BULGE: Primary | ICD-10-CM

## 2023-01-16 PROCEDURE — 99204 OFFICE O/P NEW MOD 45 MIN: CPT | Performed by: SURGERY

## 2023-01-16 PROCEDURE — 3008F BODY MASS INDEX DOCD: CPT | Performed by: SURGERY

## 2023-01-16 NOTE — PROGRESS NOTES
Patient ID: Grace Mobley                              : 1985  MRN: [id number]                                            Visit Date: 2023  Encounter Provider: Rex Bess    Chief Complaint: Consult    Grace Mobley is a 37 y.o. female presenting for evaluation of a cystic lesion in the right lower quadrant.  She complains of a lump that first appeared in her right lower quadrant on .  It lasted about a month.  It was not painful but it was somewhat uncomfortable, and she could see it through her clothing.  She presented to her PCP and gynecologist.  An ultrasound was unrevealing.  After it subsided, it continued to reappear a few times when she was at the midpoint of her menstrual cycle.  The last time it was very visible was in early December.  Last week, she started to feel a very subtle bulge, but it disappeared quickly and did not get as large as it had been.  She currently is menstruating, and she is not aware of any swelling or discomfort right now.  She says this is the longest period of time that it has been quiescent since she first noticed it.  An MRI revealed a cystic lesion that was not well characterized.    She previously saw me in 2019 regarding right inguinal pain with a normal exam.  At the time, we discussed the possibility of endometriosis of the scar or perhaps a sports hernia.  That pain disappeared and she did not follow-up.    Past Medical History:  has a past medical history of Asthma and Depression.  Past Surgical History:  has a past surgical history that includes  section; LASIK; and [place] tooth extraction.  Family History: family history includes Cancer in her paternal grandfather; Diabetes in her biological sister and paternal grandfather; Hypertension in her biological father; Stroke in her maternal grandfather and paternal grandmother.   Allergies: is allergic to bactrim [sulfamethoxazole-trimethoprim]; ciprofloxacin;  "latex, natural rubber; and pineapple.     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    Medications:   Current Outpatient Medications:   •  cranberry fruit concentrate (AZO CRANBERRY ORAL), Take by mouth., Disp: , Rfl:   •  fucosyllactose/hioub-D-zqgdkbb (CULTURELLE IBS COMPLETE SUPPRT ORAL), Take by mouth., Disp: , Rfl:     Physical exam:  Visit Vitals  /83   Pulse (!) 112   Ht 1.626 m (5' 4\")   Wt 50.8 kg (112 lb)   BMI 19.22 kg/m²     General appearance: Very pleasant, no acute distress. Alert, responds appropriately.   HEENT: Normocephalic, without obvious abnormality. Conjunctiva clear. Sclerae anicteric. Isolation mask in place. No cough.   Abdomen: Thin, soft, nontender and nondistended.  Well-healed Pfannenstiel scar.  The area of swelling that she indicates is below the scar, near the top of the inguinal canal.  There is no palpable abnormality or tenderness in this area.  No palpable hernia.    Studies:  I reviewed the images of her pelvic MRI from 11/23/2022.  A cystic area with mild peripheral enhancement is noted in the right lower quadrant abdominal wall, lateral to the rectus muscle along the deep aspect of the fascia.  It was thought to be related to sequela of prior injury versus infection, and clinical correlation was recommended.  It measured 2.4 x 1.0 x 1.9 cm and was T2 hyperintense.    Assessment and Plan:  Right inguinal abdominal wall cyst versus hernia.  Currently her physical exam is normal.  I am not sure what to make of the MRI findings, but they do not seem worrisome.  For now, the lesion clinically is subsiding.  I recommended expectant management.  I will review the MRI with the radiologist.  I asked her to contact me immediately if she develops recurrent swelling.  At that point she will " come right back to the office for reexamination.    Rex Bess MD

## 2023-01-16 NOTE — LETTER
2023     SANDY Lockett  [address]  [address]  [place] [address]    Patient: Grace Mobley  YOB: 1985  Date of Visit: 2023      Dear Dr. Gutierrez:    Thank you for referring Grace Mobley to me for evaluation. Below are my notes for this consultation.    If you have questions, please do not hesitate to call me. My mobile phone number is [phone], and my office number is [phone].  I look forward to following your patient along with you.        Sincerely,        Rex Bess MD        CC: DO Maria Alejandra Wood Jonathan Y, MD  2023 11:53 AM  Signed  Patient ID: Grace Mobley                              : 1985  MRN: [id number]                                            Visit Date: 2023  Encounter Provider: Rex Bess    Chief Complaint: Consult    Grace Mobley is a 37 y.o. female presenting for evaluation of a cystic lesion in the right lower quadrant.  She complains of a lump that first appeared in her right lower quadrant on .  It lasted about a month.  It was not painful but it was somewhat uncomfortable, and she could see it through her clothing.  She presented to her PCP and gynecologist.  An ultrasound was unrevealing.  After it subsided, it continued to reappear a few times when she was at the midpoint of her menstrual cycle.  The last time it was very visible was in early December.  Last week, she started to feel a very subtle bulge, but it disappeared quickly and did not get as large as it had been.  She currently is menstruating, and she is not aware of any swelling or discomfort right now.  She says this is the longest period of time that it has been quiescent since she first noticed it.  An MRI revealed a cystic lesion that was not well characterized.    She previously saw me in 2019 regarding right inguinal pain with a normal exam.  At the time, we discussed  "the possibility of endometriosis of the scar or perhaps a sports hernia.  That pain disappeared and she did not follow-up.    Past Medical History:  has a past medical history of Asthma and Depression.  Past Surgical History:  has a past surgical history that includes  section; LASIK; and [place] tooth extraction.  Family History: family history includes Cancer in her paternal grandfather; Diabetes in her biological sister and paternal grandfather; Hypertension in her biological father; Stroke in her maternal grandfather and paternal grandmother.   Allergies: is allergic to bactrim [sulfamethoxazole-trimethoprim]; ciprofloxacin; latex, natural rubber; and pineapple.     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    Medications:   Current Outpatient Medications:   •  cranberry fruit concentrate (AZO CRANBERRY ORAL), Take by mouth., Disp: , Rfl:   •  fucosyllactose/izpfd-U-yxztpox (CULTURELLE IBS COMPLETE SUPPRT ORAL), Take by mouth., Disp: , Rfl:     Physical exam:  Visit Vitals  /83   Pulse (!) 112   Ht 1.626 m (5' 4\")   Wt 50.8 kg (112 lb)   BMI 19.22 kg/m²     General appearance: Very pleasant, no acute distress. Alert, responds appropriately.   HEENT: Normocephalic, without obvious abnormality. Conjunctiva clear. Sclerae anicteric. Isolation mask in place. No cough.   Abdomen: Thin, soft, nontender and nondistended.  Well-healed Pfannenstiel scar.  The area of swelling that she indicates is below the scar, near the top of the inguinal canal.  There is no palpable abnormality or tenderness in this area.  No palpable hernia.    Studies:  I reviewed the images of her pelvic MRI from 2022.  A cystic area with mild peripheral enhancement is noted in the right lower quadrant abdominal wall, lateral to the " rectus muscle along the deep aspect of the fascia.  It was thought to be related to sequela of prior injury versus infection, and clinical correlation was recommended.  It measured 2.4 x 1.0 x 1.9 cm and was T2 hyperintense.    Assessment and Plan:  Right inguinal abdominal wall cyst versus hernia.  Currently her physical exam is normal.  I am not sure what to make of the MRI findings, but they do not seem worrisome.  For now, the lesion clinically is subsiding.  I recommended expectant management.  I will review the MRI with the radiologist.  I asked her to contact me immediately if she develops recurrent swelling.  At that point she will come right back to the office for reexamination.    Rex Bess MD

## 2023-01-23 ENCOUNTER — TELEPHONE (OUTPATIENT)
Dept: SURGERY | Facility: CLINIC | Age: 38
End: 2023-01-23
Payer: COMMERCIAL

## 2023-01-23 NOTE — TELEPHONE ENCOUNTER
I called the patient to follow-up her office visit last week.  I reviewed the MRI images with the radiologist.  She tells me the lump has not reappeared.  I encouraged her to contact me if it does.  We will continue expectant management.

## 2023-04-10 ENCOUNTER — TRANSCRIBE ORDERS (OUTPATIENT)
Dept: SCHEDULING | Age: 38
End: 2023-04-10

## 2023-04-10 DIAGNOSIS — Z36.9 ENCOUNTER FOR ANTENATAL SCREENING OF MOTHER: Primary | ICD-10-CM

## 2023-04-10 DIAGNOSIS — O09.521 SUPERVISION OF ELDERLY MULTIGRAVIDA, FIRST TRIMESTER: ICD-10-CM

## 2023-04-11 LAB
HBV SURFACE AG SER QL: NONREACTIVE
HCV AB SER QL: NONREACTIVE
HIV 1+2 AB+HIV1 P24 AG SERPL QL IA: NONREACTIVE
RUBELLA IGG SCREEN: NORMAL
T PALLIDUM AB SER QL IF: NONREACTIVE
VZV IGG SER-ACNC: 3 AU/ML (ref ?–99)

## 2023-05-08 ENCOUNTER — HOSPITAL ENCOUNTER (OUTPATIENT)
Dept: PERINATAL CARE | Facility: HOSPITAL | Age: 38
Discharge: HOME | End: 2023-05-08
Attending: OBSTETRICS & GYNECOLOGY
Payer: COMMERCIAL

## 2023-05-08 ENCOUNTER — TRANSCRIBE ORDERS (OUTPATIENT)
Dept: SCHEDULING | Age: 38
End: 2023-05-08

## 2023-05-08 DIAGNOSIS — O09.521 SUPERVISION OF ELDERLY MULTIGRAVIDA, FIRST TRIMESTER: Primary | ICD-10-CM

## 2023-05-08 DIAGNOSIS — Z36.3 ANTENATAL SCREENING FOR MALFORMATION USING ULTRASONICS: ICD-10-CM

## 2023-05-08 DIAGNOSIS — O35.9XX0 MATERNAL CARE FOR (SUSPECTED) FETAL ABNORMALITY AND DAMAGE, UNSPECIFIED, NOT APPLICABLE OR UNSPECIFIED: ICD-10-CM

## 2023-05-08 DIAGNOSIS — Z36.82 ENCOUNTER FOR NUCHAL TRANSLUCENCY TESTING: ICD-10-CM

## 2023-05-08 DIAGNOSIS — Z36.9 ENCOUNTER FOR ANTENATAL SCREENING OF MOTHER: Primary | ICD-10-CM

## 2023-05-08 DIAGNOSIS — Z3A.12 12 WEEKS GESTATION OF PREGNANCY: ICD-10-CM

## 2023-05-08 PROCEDURE — 76813 OB US NUCHAL MEAS 1 GEST: CPT

## 2023-07-11 ENCOUNTER — TRANSCRIBE ORDERS (OUTPATIENT)
Dept: REGISTRATION | Facility: HOSPITAL | Age: 38
End: 2023-07-11

## 2023-07-11 ENCOUNTER — HOSPITAL ENCOUNTER (OUTPATIENT)
Dept: PERINATAL CARE | Facility: HOSPITAL | Age: 38
Discharge: HOME | End: 2023-07-11
Attending: OBSTETRICS & GYNECOLOGY
Payer: COMMERCIAL

## 2023-07-11 DIAGNOSIS — Z3A.21 21 WEEKS GESTATION OF PREGNANCY: ICD-10-CM

## 2023-07-11 DIAGNOSIS — Z36.3 ANTENATAL SCREENING FOR MALFORMATION USING ULTRASONICS: ICD-10-CM

## 2023-07-11 DIAGNOSIS — O09.522 SUPERVISION OF ELDERLY MULTIGRAVIDA IN SECOND TRIMESTER: ICD-10-CM

## 2023-07-11 DIAGNOSIS — O09.523 SUPERVISION OF ELDERLY MULTIGRAVIDA, THIRD TRIMESTER: Primary | ICD-10-CM

## 2023-07-11 DIAGNOSIS — O35.9XX0 MATERNAL CARE FOR (SUSPECTED) FETAL ABNORMALITY AND DAMAGE, UNSPECIFIED, NOT APPLICABLE OR UNSPECIFIED: Primary | ICD-10-CM

## 2023-07-11 PROCEDURE — 76811 OB US DETAILED SNGL FETUS: CPT

## 2023-09-25 ENCOUNTER — HOSPITAL ENCOUNTER (OUTPATIENT)
Dept: PERINATAL CARE | Facility: HOSPITAL | Age: 38
Discharge: HOME | End: 2023-09-25
Attending: OBSTETRICS & GYNECOLOGY
Payer: COMMERCIAL

## 2023-09-25 ENCOUNTER — TRANSCRIBE ORDERS (OUTPATIENT)
Dept: SCHEDULING | Age: 38
End: 2023-09-25

## 2023-09-25 DIAGNOSIS — Z36.9 ENCOUNTER FOR ANTENATAL SCREENING OF MOTHER: Primary | ICD-10-CM

## 2023-09-25 DIAGNOSIS — O35.EXX0 OVARIAN CYST OF FETUS AFFECTING MANAGEMENT OF PREGNANCY: Primary | ICD-10-CM

## 2023-09-25 DIAGNOSIS — O35.9XX0 MATERNAL CARE FOR (SUSPECTED) FETAL ABNORMALITY AND DAMAGE, UNSPECIFIED, NOT APPLICABLE OR UNSPECIFIED: ICD-10-CM

## 2023-09-25 DIAGNOSIS — Z3A.32 32 WEEKS GESTATION OF PREGNANCY: ICD-10-CM

## 2023-09-25 PROCEDURE — 76805 OB US >/= 14 WKS SNGL FETUS: CPT

## 2023-10-25 ENCOUNTER — HOSPITAL ENCOUNTER (OUTPATIENT)
Dept: PERINATAL CARE | Facility: HOSPITAL | Age: 38
Discharge: HOME | End: 2023-10-25
Attending: OBSTETRICS & GYNECOLOGY
Payer: COMMERCIAL

## 2023-10-25 ENCOUNTER — TRANSCRIBE ORDERS (OUTPATIENT)
Dept: REGISTRATION | Facility: HOSPITAL | Age: 38
End: 2023-10-25

## 2023-10-25 DIAGNOSIS — Z3A.36 36 WEEKS GESTATION OF PREGNANCY: ICD-10-CM

## 2023-10-25 DIAGNOSIS — O28.3 ABNORMAL PRENATAL ULTRASOUND: Primary | ICD-10-CM

## 2023-10-25 DIAGNOSIS — Z36.3 ENCOUNTER FOR ANTENATAL SCREENING FOR MALFORMATIONS: Primary | ICD-10-CM

## 2023-10-25 PROCEDURE — 76816 OB US FOLLOW-UP PER FETUS: CPT

## 2023-11-01 ENCOUNTER — HOSPITAL ENCOUNTER (OUTPATIENT)
Dept: PERINATAL CARE | Facility: HOSPITAL | Age: 38
Discharge: HOME | End: 2023-11-01
Attending: OBSTETRICS & GYNECOLOGY
Payer: COMMERCIAL

## 2023-11-01 VITALS — DIASTOLIC BLOOD PRESSURE: 67 MMHG | SYSTOLIC BLOOD PRESSURE: 110 MMHG

## 2023-11-01 DIAGNOSIS — O09.523 AMA (ADVANCED MATERNAL AGE) MULTIGRAVIDA 35+, THIRD TRIMESTER: Primary | ICD-10-CM

## 2023-11-01 DIAGNOSIS — Z3A.37 37 WEEKS GESTATION OF PREGNANCY: ICD-10-CM

## 2023-11-01 LAB — GP B STREP SPEC QL CULT: NORMAL

## 2023-11-01 PROCEDURE — 76815 OB US LIMITED FETUS(S): CPT

## 2023-11-07 ENCOUNTER — HOSPITAL ENCOUNTER (INPATIENT)
Facility: HOSPITAL | Age: 38
LOS: 2 days | Discharge: HOME | End: 2023-11-09
Attending: OBSTETRICS & GYNECOLOGY | Admitting: OBSTETRICS & GYNECOLOGY
Payer: COMMERCIAL

## 2023-11-07 ENCOUNTER — ANESTHESIA (INPATIENT)
Dept: OBSTETRICS AND GYNECOLOGY | Facility: HOSPITAL | Age: 38
End: 2023-11-07
Payer: COMMERCIAL

## 2023-11-07 ENCOUNTER — ANESTHESIA EVENT (INPATIENT)
Dept: OBSTETRICS AND GYNECOLOGY | Facility: HOSPITAL | Age: 38
End: 2023-11-07
Payer: COMMERCIAL

## 2023-11-07 PROBLEM — Z34.90 NORMAL PREGNANCY, UNSPECIFIED TRIMESTER: Status: ACTIVE | Noted: 2023-11-07

## 2023-11-07 LAB
ABO + RH BLD: NORMAL
BLD GP AB SCN SERPL QL: NEGATIVE
D AG BLD QL: POSITIVE
ERYTHROCYTE [DISTWIDTH] IN BLOOD BY AUTOMATED COUNT: 12.8 % (ref 11.7–14.4)
HBV SURFACE AG SER QL: NONREACTIVE
HCT VFR BLDCO AUTO: 38.4 % (ref 35–45)
HGB BLD-MCNC: 13 G/DL (ref 11.8–15.7)
LABORATORY COMMENT REPORT: NORMAL
MCH RBC QN AUTO: 30.1 PG (ref 28–33.2)
MCHC RBC AUTO-ENTMCNC: 33.9 G/DL (ref 32.2–35.5)
MCV RBC AUTO: 88.9 FL (ref 83–98)
PDW BLD AUTO: 10.6 FL (ref 9.4–12.3)
PLATELET # BLD AUTO: 281 K/UL (ref 150–369)
RBC # BLD AUTO: 4.32 M/UL (ref 3.93–5.22)
SPECIMEN EXP DATE BLD: NORMAL
T PALLIDUM AB SER QL IF: NONREACTIVE
WBC # BLD AUTO: 13.38 K/UL (ref 3.8–10.5)

## 2023-11-07 PROCEDURE — 0HQ9XZZ REPAIR PERINEUM SKIN, EXTERNAL APPROACH: ICD-10-PCS | Performed by: OBSTETRICS & GYNECOLOGY

## 2023-11-07 PROCEDURE — 72000011 HC VAGINAL DELIVERY LEVEL 1

## 2023-11-07 PROCEDURE — 25000000 HC PHARMACY GENERAL: Performed by: STUDENT IN AN ORGANIZED HEALTH CARE EDUCATION/TRAINING PROGRAM

## 2023-11-07 PROCEDURE — 63700000 HC SELF-ADMINISTRABLE DRUG: Performed by: NURSE PRACTITIONER

## 2023-11-07 PROCEDURE — 37000005 HC ANESTHESIA EPIDURAL/SPINAL: Performed by: ANESTHESIOLOGY

## 2023-11-07 PROCEDURE — 86900 BLOOD TYPING SEROLOGIC ABO: CPT

## 2023-11-07 PROCEDURE — 86780 TREPONEMA PALLIDUM: CPT

## 2023-11-07 PROCEDURE — 25000000 HC PHARMACY GENERAL: Performed by: ANESTHESIOLOGY

## 2023-11-07 PROCEDURE — 85027 COMPLETE CBC AUTOMATED: CPT

## 2023-11-07 PROCEDURE — 25800000 HC PHARMACY IV SOLUTIONS

## 2023-11-07 PROCEDURE — 87340 HEPATITIS B SURFACE AG IA: CPT

## 2023-11-07 PROCEDURE — 63600000 HC DRUGS/DETAIL CODE: Mod: JZ

## 2023-11-07 PROCEDURE — 12000000 HC ROOM AND CARE MED/SURG

## 2023-11-07 PROCEDURE — 36415 COLL VENOUS BLD VENIPUNCTURE: CPT

## 2023-11-07 RX ORDER — DIPHENHYDRAMINE HCL 25 MG
25 CAPSULE ORAL EVERY 6 HOURS PRN
Status: DISCONTINUED | OUTPATIENT
Start: 2023-11-07 | End: 2023-11-09 | Stop reason: HOSPADM

## 2023-11-07 RX ORDER — AMOXICILLIN 250 MG
1 CAPSULE ORAL 2 TIMES DAILY
Status: DISCONTINUED | OUTPATIENT
Start: 2023-11-07 | End: 2023-11-09 | Stop reason: HOSPADM

## 2023-11-07 RX ORDER — OXYTOCIN 10 [USP'U]/ML
10 INJECTION, SOLUTION INTRAMUSCULAR; INTRAVENOUS ONCE AS NEEDED
Status: DISCONTINUED | OUTPATIENT
Start: 2023-11-07 | End: 2023-11-07

## 2023-11-07 RX ORDER — LIDOCAINE HYDROCHLORIDE 10 MG/ML
0-30 INJECTION, SOLUTION EPIDURAL; INFILTRATION; INTRACAUDAL; PERINEURAL ONCE AS NEEDED
Status: DISCONTINUED | OUTPATIENT
Start: 2023-11-07 | End: 2023-11-07

## 2023-11-07 RX ORDER — DIBUCAINE 1 %
1 OINTMENT (GRAM) TOPICAL AS NEEDED
Status: DISCONTINUED | OUTPATIENT
Start: 2023-11-07 | End: 2023-11-09 | Stop reason: HOSPADM

## 2023-11-07 RX ORDER — CALCIUM CARBONATE 200(500)MG
500 TABLET,CHEWABLE ORAL EVERY 4 HOURS PRN
Status: DISCONTINUED | OUTPATIENT
Start: 2023-11-07 | End: 2023-11-09 | Stop reason: HOSPADM

## 2023-11-07 RX ORDER — ACETAMINOPHEN 325 MG/1
650 TABLET ORAL EVERY 4 HOURS PRN
Status: DISCONTINUED | OUTPATIENT
Start: 2023-11-07 | End: 2023-11-09 | Stop reason: HOSPADM

## 2023-11-07 RX ORDER — OXYTOCIN/0.9 % SODIUM CHLORIDE 30/500 ML
0-20 PLASTIC BAG, INJECTION (ML) INTRAVENOUS CONTINUOUS
Status: DISCONTINUED | OUTPATIENT
Start: 2023-11-07 | End: 2023-11-07

## 2023-11-07 RX ORDER — ONDANSETRON 4 MG/1
4 TABLET, ORALLY DISINTEGRATING ORAL EVERY 6 HOURS PRN
Status: DISCONTINUED | OUTPATIENT
Start: 2023-11-07 | End: 2023-11-09 | Stop reason: HOSPADM

## 2023-11-07 RX ORDER — METHYLERGONOVINE MALEATE 0.2 MG/ML
200 INJECTION INTRAVENOUS ONCE AS NEEDED
Status: DISCONTINUED | OUTPATIENT
Start: 2023-11-07 | End: 2023-11-07

## 2023-11-07 RX ORDER — TRANEXAMIC ACID 10 MG/ML
1000 INJECTION, SOLUTION INTRAVENOUS ONCE AS NEEDED
Status: DISCONTINUED | OUTPATIENT
Start: 2023-11-07 | End: 2023-11-07

## 2023-11-07 RX ORDER — EPHEDRINE SULFATE/0.9% NACL/PF 50 MG/5 ML
10 SYRINGE (ML) INTRAVENOUS EVERY 10 MIN PRN
Status: DISCONTINUED | OUTPATIENT
Start: 2023-11-07 | End: 2023-11-07

## 2023-11-07 RX ORDER — DIPHENHYDRAMINE HCL 50 MG/ML
25 VIAL (ML) INJECTION EVERY 6 HOURS PRN
Status: DISCONTINUED | OUTPATIENT
Start: 2023-11-07 | End: 2023-11-09 | Stop reason: HOSPADM

## 2023-11-07 RX ORDER — LIDOCAINE HYDROCHLORIDE AND EPINEPHRINE 15; 5 MG/ML; UG/ML
INJECTION, SOLUTION EPIDURAL
Status: COMPLETED | OUTPATIENT
Start: 2023-11-07 | End: 2023-11-07

## 2023-11-07 RX ORDER — CARBOPROST TROMETHAMINE 250 UG/ML
250 INJECTION, SOLUTION INTRAMUSCULAR ONCE AS NEEDED
Status: DISCONTINUED | OUTPATIENT
Start: 2023-11-07 | End: 2023-11-07

## 2023-11-07 RX ORDER — SODIUM CHLORIDE, SODIUM LACTATE, POTASSIUM CHLORIDE, CALCIUM CHLORIDE 600; 310; 30; 20 MG/100ML; MG/100ML; MG/100ML; MG/100ML
125 INJECTION, SOLUTION INTRAVENOUS CONTINUOUS
Status: DISCONTINUED | OUTPATIENT
Start: 2023-11-07 | End: 2023-11-07

## 2023-11-07 RX ORDER — ONDANSETRON HYDROCHLORIDE 2 MG/ML
4 INJECTION, SOLUTION INTRAVENOUS EVERY 6 HOURS PRN
Status: DISCONTINUED | OUTPATIENT
Start: 2023-11-07 | End: 2023-11-09 | Stop reason: HOSPADM

## 2023-11-07 RX ORDER — OXYTOCIN/0.9 % SODIUM CHLORIDE 40/1000ML
500 PLASTIC BAG, INJECTION (ML) INTRAVENOUS ONCE
Status: ACTIVE | OUTPATIENT
Start: 2023-11-07 | End: 2023-11-07

## 2023-11-07 RX ORDER — IBUPROFEN 600 MG/1
600 TABLET ORAL EVERY 6 HOURS PRN
Status: DISCONTINUED | OUTPATIENT
Start: 2023-11-07 | End: 2023-11-09 | Stop reason: HOSPADM

## 2023-11-07 RX ORDER — MISOPROSTOL 100 UG/1
1000 TABLET ORAL ONCE AS NEEDED
Status: DISCONTINUED | OUTPATIENT
Start: 2023-11-07 | End: 2023-11-07

## 2023-11-07 RX ORDER — ALUMINUM HYDROXIDE, MAGNESIUM HYDROXIDE, AND SIMETHICONE 1200; 120; 1200 MG/30ML; MG/30ML; MG/30ML
30 SUSPENSION ORAL EVERY 4 HOURS PRN
Status: DISCONTINUED | OUTPATIENT
Start: 2023-11-07 | End: 2023-11-09 | Stop reason: HOSPADM

## 2023-11-07 RX ORDER — SODIUM CHLORIDE, SODIUM LACTATE, POTASSIUM CHLORIDE, CALCIUM CHLORIDE 600; 310; 30; 20 MG/100ML; MG/100ML; MG/100ML; MG/100ML
INJECTION, SOLUTION INTRAVENOUS CONTINUOUS
Status: DISCONTINUED | OUTPATIENT
Start: 2023-11-07 | End: 2023-11-07

## 2023-11-07 RX ORDER — OXYTOCIN/0.9 % SODIUM CHLORIDE 40/1000ML
PLASTIC BAG, INJECTION (ML) INTRAVENOUS CONTINUOUS
Status: DISCONTINUED | OUTPATIENT
Start: 2023-11-07 | End: 2023-11-07

## 2023-11-07 RX ORDER — TERBUTALINE SULFATE 1 MG/ML
0.25 INJECTION SUBCUTANEOUS ONCE AS NEEDED
Status: DISCONTINUED | OUTPATIENT
Start: 2023-11-07 | End: 2023-11-07

## 2023-11-07 RX ORDER — FENTANYL/ROPIVACAINE/NS/PF 2-1500 MCG
PREFILLED PUMP RESERVOIR INJECTION CONTINUOUS
Status: DISCONTINUED | OUTPATIENT
Start: 2023-11-07 | End: 2023-11-07

## 2023-11-07 RX ADMIN — DIBUCAINE 1 APPLICATION: 0.28 OINTMENT TOPICAL at 16:06

## 2023-11-07 RX ADMIN — IBUPROFEN 600 MG: 600 TABLET, FILM COATED ORAL at 17:44

## 2023-11-07 RX ADMIN — OXYTOCIN: 10 INJECTION INTRAVENOUS at 14:19

## 2023-11-07 RX ADMIN — BENZOCAINE AND LEVOMENTHOL: 200; 5 SPRAY TOPICAL at 16:06

## 2023-11-07 RX ADMIN — DOCUSATE SODIUM 50 MG AND SENNOSIDES 8.6 MG 1 TABLET: 8.6; 5 TABLET, FILM COATED ORAL at 19:56

## 2023-11-07 RX ADMIN — IBUPROFEN 600 MG: 600 TABLET, FILM COATED ORAL at 23:44

## 2023-11-07 RX ADMIN — Medication 50 ML: at 11:24

## 2023-11-07 RX ADMIN — LIDOCAINE HYDROCHLORIDE,EPINEPHRINE BITARTRATE 5 ML: 15; .005 INJECTION, SOLUTION EPIDURAL; INFILTRATION; INTRACAUDAL; PERINEURAL at 11:10

## 2023-11-07 RX ADMIN — PRENATAL VITAMINS-IRON FUMARATE 27 MG IRON-FOLIC ACID 0.8 MG TABLET 1 TABLET: at 16:06

## 2023-11-07 RX ADMIN — SODIUM CHLORIDE, POTASSIUM CHLORIDE, SODIUM LACTATE AND CALCIUM CHLORIDE 1000 ML: 600; 310; 30; 20 INJECTION, SOLUTION INTRAVENOUS at 09:05

## 2023-11-07 RX ADMIN — OXYTOCIN-SODIUM CHLORIDE 0.9% IV SOLN 30 UNIT/500ML 2 MILLI-UNITS/MIN: 30-0.9/5 SOLUTION at 09:06

## 2023-11-07 ASSESSMENT — ENCOUNTER SYMPTOMS: DEPRESSION: 1

## 2023-11-07 NOTE — PROGRESS NOTES
Labor and Delivery Progress Note    Subjective     Patient comfortable without epidural.      Objective     Vitals:  Temp:  [37.2 °C (99 °F)] 37.2 °C (99 °F)  Heart Rate:  [80] 80  Resp:  [16] 16  BP: (116)/(69) 116/69    Fetal Monitoring:  FHR Baseline: 145  FHR Variability: moderate  FHR Accelerations: present  FHR Decelerations: absent    Contraction Frequency: q7-10min    Cervical Exam:   Cervical Dilation (cm): 2  Cervical Effacement: 50  Fetal Station: -3  Method: sterile exam per physician (23 0639)      Assessment/Plan     Grace Mobley is a 38 y.o.  at 38w3d admitted for TOLAC/SROM.    1. FHT: Category I.  2. GBS: Negative.  3. TOLAC/SROM: CVE 2/50/-3. Varghese q7-10 mins. Continue expectant mgt. Patient needs IV access. Can consider pitocin once IV in place.     Plan d/w Dr.High Neda Carpenter MD

## 2023-11-07 NOTE — PROGRESS NOTES
"Attending note    S: feeling more contractions     O:   Visit Vitals  /69   Pulse 80   Temp 37.1 °C (98.7 °F) (Oral)   Resp 18   Ht 1.626 m (5' 4\")   Wt 66.7 kg (147 lb)   LMP 2023   BMI 25.23 kg/m²     FHT: 130/mod ronny/+accels/-decels   TOCO: q2-4 min   CERVIX: 3/80/-2     A/P: 37 yo  at 38 3/7 wks, TOLAC, PROM    1. FHT cat 1  2. GBS neg  3. TOLAC / PROM - pitocin was started as contractions spaced and minimal cervical change. Now 3/80/-2, I also ruptured forebag. Discussed risks of TOLAC like uterine rupture and emergency , I also delivered Grace's last . All questions answered and she plans to get epidural soon, which I recommend     Swati Denny MD   "

## 2023-11-07 NOTE — ANESTHESIOLOGIST PRE-PROCEDURE ATTESTATION
Pre-Procedure Patient Identification:  I am the Primary Anesthesiologist and have identified the patient on 11/07/23 at 1104AM  I have confirmed the procedure(s) will be performed by the following surgeon/proceduralist * No surgeons listed *.

## 2023-11-07 NOTE — PLAN OF CARE
Problem: Adult Inpatient Plan of Care  Goal: Plan of Care Review  Outcome: Progressing  Goal: Patient-Specific Goal (Individualized)  Outcome: Progressing  Goal: Absence of Hospital-Acquired Illness or Injury  Outcome: Progressing  Goal: Optimal Comfort and Wellbeing  Outcome: Progressing  Goal: Readiness for Transition of Care  Outcome: Progressing     Problem: Labor  Goal: Hemostasis  Outcome: Progressing  Goal: Stable Fetal Wellbeing  Outcome: Progressing  Goal: Effective Progression to Delivery  Outcome: Progressing  Goal: Absence of Infection Signs and Symptoms  Outcome: Progressing  Goal: Acceptable Pain Control  Outcome: Progressing  Goal: Normal Uterine Contraction Pattern  Outcome: Progressing

## 2023-11-07 NOTE — ANESTHESIA PROCEDURE NOTES
Epidural Block    Patient location during procedure: OB  Start time: 11/7/2023 11:05 AM  End time: 11/7/2023 11:15 AM  Reason for block: post-op pain management  Staffing  Performed: anesthesiologist   Anesthesiologist: Carl Costello MD  Performed by: Carl Costello MD  Authorized by: Carl Costelol MD    Preanesthetic Checklist  Completed: patient identified, surgical consent, pre-op evaluation, timeout performed, IV checked, risks and benefits discussed, monitors and equipment checked and sterile field maintained during procedure  Needle  Needle type: Tuohy   Needle gauge: 17 G  Needle length: 3.5 in  Needle insertion depth: 6 cm  Catheter type: Single orifice  Catheter size: 19 G  Catheter at skin depth: 10 cm  Test dose: negative and lidocaine 1.5% with epinephrine 1-to-200,000  Assessment  Sensory level: T10  Additional Notes  Procedure well tolerated. Vital signs stable. No paresthesia.  Analgesia satisfactory. Patients nurse to notify anesthesiologist if hemodynamically unstable.    Medications Administered -   lidocaine 1.5%-EPINEPHrine 1:200,000 PF (XYLOCAINE W/EPI) injection - epidural, Back   5 mL - 11/7/2023 11:10:00 AM

## 2023-11-07 NOTE — ANESTHESIA PREPROCEDURE EVALUATION
"      Problem List  Current as of 11/07/23 1119  Contact dermatitis   Encounter for induction of labor   Fetal malpresentation   Normal pregnancy, unspecified trimester     Medical History  Current as of 11/07/23 1119  History Comments   Depression    Asthma        Anesthesia ROS/MED HX      Pulmonary    asthma  Neuro/Psych    Depression  Endo/Other  Body Habitus: Normal      Relevant Problems   No relevant active problems     I have reviewed the following records for  Grace Mobley.    Lab Results   Component Value Date    WBC 13.38 (H) 11/07/2023    HGB 13.0 11/07/2023    HCT 38.4 11/07/2023    MCV 88.9 11/07/2023     11/07/2023     No results found for: \"GLUCOSE\", \"CALCIUM\", \"NA\", \"K\", \"CO2\", \"CL\", \"BUN\", \"CREATININE\"  No results found for: \"HCGPREGUR\", \"PREGSERUM\", \"HCG\", \"HCGQUANT\"  @LABRCNTIP(aptt,inr,ptt)  )  Current Facility-Administered Medications   Medication Dose Route Frequency Provider Last Rate Last Admin    carboprost (HEMABATE) injection 250 mcg  250 mcg intramuscular Once PRN Clara Mccallum MD        lactated ringer's infusion   intravenous Continuous Clara Mccallum MD        lactated ringer's infusion  125 mL/hr intravenous Continuous Seema Kaplan DO        And    oxytocin in 0.9 % NSS (PITOCIN) 30 unit/500 mL infusion  0-20 omero-units/min intravenous Continuous Seema Kaplan, DO 8 mL/hr at 11/07/23 1054 8 omero-units/min at 11/07/23 1054    lidocaine PF (XYLOCAINE) 10 mg/mL (1 %) injection 0-30 mL  0-30 mL subcutaneous Once PRN Clara Mccallum MD        methylergonovine (METHERGINE) injection 200 mcg  200 mcg intramuscular Once PRN Clara Mccallum MD        miSOPROStoL (CYTOTEC) tablet 1,000 mcg  1,000 mcg rectal Once PRN Clara Mccallum MD        oxytocin (PITOCIN) injection 10 Units  10 Units intramuscular Once PRN Clara Mccallum MD        oxytocin in NSS (PITOCIN) 40 unit/1000 mL infusion 20 Units  500 mL intravenous Once Clara Mccallum " "MD        Followed by    oxytocin in NSS (PITOCIN) 40 unit/1000 mL infusion   intravenous Continuous Clara Mccallum MD        terbutaline (BRETHINE) injection 0.25 mg  0.25 mg subcutaneous Once PRN Clara Mccallum MD        tranexamic acid (CYKLOKAPRON) 1000 mg/100 mL sodium chloride 0.7% (premix)  1,000 mg intravenous Once PRN Clara Mccallum MD         Prior to Admission medications    Medication Sig Start Date End Date Taking? Authorizing Provider   cranberry fruit concentrate (AZO CRANBERRY ORAL) Take by mouth.   Yes Provider, Richy Ribeiro MD   fucosyllactose/dtmzs-I-roxnhto (CULTURELLE IBS COMPLETE SUPPRT ORAL) Take by mouth.   Yes Provider, Richy Ribeiro MD   prenatal vit no.130-iron-folic 27 mg iron- 800 mcg tablet tablet Take 1 tablet by mouth daily.   Yes Provider, MD Carl Salazar MD        Physical Exam    Airway   Mallampati: II   TM distance: >3 FB   Neck ROM: full  Cardiovascular - normal   Rhythm: regular   Rate: normalPulmonary - normal   clear to auscultation  Dental - normal    Blood pressure 116/69, pulse 80, temperature 37.1 °C (98.7 °F), temperature source Oral, resp. rate 18, height 1.626 m (5' 4\"), weight 66.7 kg (147 lb), last menstrual period 02/11/2023, currently breastfeeding.        Anesthesia Plan    Plan: epidural     Lines and Monitors: PIV   2 ASA - emergent  Blood Products:     Use of Blood Products Discussed: Yes   Anesthetic plan and risks discussed with: patient  Postop Plan:   Patient Disposition: inpatient floor planned admission   Pain Management: epidural    "

## 2023-11-07 NOTE — L&D DELIVERY NOTE
OB Vaginal Delivery Note    Delivery: 2023 at 1:58 PM   Patient: Grace Mobley  : 1985     Review the Delivery Report for details.     Delivery Details    Pre-Op Diagnosis: 38 y.o.  intrauterine pregnancy at 38w3d with vickers gestation.  GBS negative  Induction of labor secondary to prelabor of membranes   Trial of labor after  section   Post-Op Diagnosis: 1. Same as above   2. Delivery of a viable female    3. First degree perineal laceration, repaired   Delivery Clinician: Swati Denny    Delivery Assist  Delivery Nurse Toya Costello  Juliana Godinez Adán   Delivery Type:    Labor Complications: None    EBL: 250  mL   Anesthesia Type: Epidural    Placenta Delivery Expressed    Placenta Disposition: discarded    Additional Specimens Cord Blood      INFANT INFORMATION  Time of Birth: 1:58 PM   Presentation: Vertex   Position: Right Occiput Anterior   Cord: 3 vessels  Complications: None    Sex: female   [place] Weight: 3440 g (7 lb 9.3 oz)      1 Minute 5 Minute   Apgar Totals: 8   9         Grace Mobley is a 38 y.o.  at 38w3d gestation who presented to the hospital for leakage of fluid. She was admitted to labor and delivery for an induction of labor secondary to prelabor rupture of membranes and trial of labor after  section. The labor process was augmented with pitocin at 2 milliunits per minute and titrated per protocol. The patient received an epidural for analgesia upon request. The patient progressed to fully dilated and pushed for 45 minutes.  With both resident and attending at the bedside, the patient delivered a viable female  from the right occiput anterior position at 1358.  Upon delivery of the head, there was no nuchal cord noted in need of reduction.  The anterior shoulder, which was the left, delivered with ease, and the posterior shoulder followed.  Upon delivery of the infant, a spontaneous cry was  heard, and the infant appeared to be moving all four extremities. Tactile stimulation and bulb suction were performed.  The infant was placed on the maternal abdomen under direct nursing supervision.  The umbilical cord was clamped twice after 60 seconds and cut in between.    Attention was then turned to the placenta, which was delivered spontaneously shortly thereafter. It was inspected and felt to be complete with a three-vessel cord.  Upon thorough inspection of the vagina, perineum, and cervix, there was a first degree perineal laceration noted in need of repair.  A 3-0 chromic  suture was used to make the repair with a figure of eight stitch.  Upon completion of the repair, there was an excellent hemostatic and cosmetic result.  At the completion of the case, all sponges, needles, and instruments were removed from the vagina.  All sponge and needle counts were found to be correct.  The uterine fundus was massaged, felt to be firm, and lochia was within normal limits.  The patient tolerated the procedure well and was left with the infant in the delivery room to recover in stable condition.     Dr. Denny was present and scrubbed for the delivery and repair.    Toya Costello MD    I agree with the operative report and was present and scrubbed for the entire procedure.     Swati Denny MD

## 2023-11-07 NOTE — PROGRESS NOTES
Labor and Delivery Progress Note    Subjective     Patient uncomfortable C/O pressure     Objective     Vital Signs for the last 24 hours:  Temp:  [37.1 °C (98.7 °F)-37.2 °C (99 °F)] 37.1 °C (98.7 °F)  Heart Rate:  [80] 80  Resp:  [16-18] 18  BP: (116)/(69) 116/69     Fetal Monitoring:  FHR Baseline: 130  FHR Variability: moderate  FHR Accelerations: present  FHR Decelerations: absent     Contraction Frequency: q1-3min    IUPC: no    Latest cervical exam:  Cervical Dilation (cm): 10  Cervical Effacement: 100  Fetal Station: +2  Method: sterile exam per physician (11/07/23 1209)    Vaginal Bleeding: not present (11/07/23 0422)      Current Facility-Administered Medications:     carboprost (HEMABATE) injection 250 mcg, 250 mcg, intramuscular, Once PRN, Clara Mccallum MD    ePHEDrine sulfate-0.9%NaCl(PF) 50 mg/5 mL (10 mg/mL) injection 10 mg, 10 mg, intravenous, q10 min PRN, Carl Costello MD    fentaNYL-ropivacaine-NaCl (PF) 2 mcg/mL - 0.15% PCEA syringe, , epidural, Continuous, Carl Costello MD, Last Rate: 10 mL/hr at 11/07/23 1124, 50 mL at 11/07/23 1124    lactated ringer's bolus 500 mL, 500 mL, intravenous, PRN, Carl Costello MD    [COMPLETED] lactated ringer's bolus 1,000 mL, 1,000 mL, intravenous, Once, Last Rate: 4,000 mL/hr at 11/07/23 0905, 1,000 mL at 11/07/23 0905 **FOLLOWED BY** lactated ringer's infusion, , intravenous, Continuous, Clara Mccallum MD    lactated ringer's infusion, 125 mL/hr, intravenous, Continuous **AND** oxytocin in 0.9 % NSS (PITOCIN) 30 unit/500 mL infusion, 0-20 omero-units/min, intravenous, Continuous, Seema Kaplan DO, Last Rate: 6 mL/hr at 11/07/23 1244, 6 omero-units/min at 11/07/23 1244    lidocaine PF (XYLOCAINE) 10 mg/mL (1 %) injection 0-30 mL, 0-30 mL, subcutaneous, Once PRN, Clara Mccallum MD    methylergonovine (METHERGINE) injection 200 mcg, 200 mcg, intramuscular, Once PRN, lCara Mccallum MD    miSOPROStoL (CYTOTEC) tablet 1,000 mcg, 1,000  mcg, rectal, Once PRN, Clara Mccallum MD    oxytocin (PITOCIN) injection 10 Units, 10 Units, intramuscular, Once PRN, Clara Mccallum MD    oxytocin in NSS (PITOCIN) 40 unit/1000 mL infusion 20 Units, 500 mL, intravenous, Once **FOLLOWED BY** oxytocin in NSS (PITOCIN) 40 unit/1000 mL infusion, , intravenous, Continuous, Clara Mccallum MD    terbutaline (BRETHINE) injection 0.25 mg, 0.25 mg, subcutaneous, Once PRN, Clara Mccallum MD    tranexamic acid (CYKLOKAPRON) 1000 mg/100 mL sodium chloride 0.7% (premix), 1,000 mg, intravenous, Once PRN, Clara Mccallum MD    Assessment/Plan     Grace Mobley is a 38 y.o. female  at 38w3d admitted with TOLAC PROM     FHR: Category I  GBS: negative  Pt C/O pressure now completely dilated  Dr Denny @ bedside     SANDY oGodrich

## 2023-11-07 NOTE — PROGRESS NOTES
Labor and Delivery Progress Note    Subjective     Patient comfortable without epidural.      Objective     Vitals:  Temp:  [37.1 °C (98.7 °F)-37.2 °C (99 °F)] 37.1 °C (98.7 °F)  Heart Rate:  [80] 80  Resp:  [16-18] 18  BP: (116)/(69) 116/69    Fetal Monitoring:  FHR Baseline: 140  FHR Variability: moderate  FHR Accelerations: present  FHR Decelerations: absent    Contraction Frequency: q3-8min    Cervical Exam:   Cervical Dilation (cm): 2  Cervical Effacement: 50  Fetal Station: -3  Method: sterile exam per physician (23 0639)      Assessment/Plan     Grace Mobley is a 38 y.o.  at 38w3d admitted for TOLAC/SROM.    1. FHT: Category I.  2. GBS: Negative.  3. SROM/TOLAC: CVE deferred. Will start pitocin for augmentation. Reassess in 2 hours or sooner if clinically indicated.       Discussed with Dr. Denny.     Seema Kaplan DO

## 2023-11-07 NOTE — PROGRESS NOTES
Labor and Delivery Progress Note    Subjective     Patient comfortable in bed with an epidural.      Objective     Vitals:  Temp:  [37.1 °C (98.7 °F)-37.2 °C (99 °F)] 37.1 °C (98.7 °F)  Heart Rate:  [80] 80  Resp:  [16-18] 18  BP: (116)/(69) 116/69    Fetal Monitoring:  FHR Baseline: 125  FHR Variability: moderate  FHR Accelerations: present  FHR Decelerations: non recurrent variable and non recurrent late decelerations     Contraction Frequency: q2-3mins     Cervical Exam:   Cervical Dilation (cm): 4  Cervical Effacement: 80  Fetal Station: -2  Method: sterile exam per physician (23 1209)      Assessment/Plan     Grace Mobley is a 38 y.o.  at 38w3d admitted for TOLAC/ PROM .    1. FHT: Category II 2/2 to non recurrent and variable decelerations. Reassured by moderate variability and accelerations. Continue maternal repositioning  2. GBS: Negative.  3. TOLAC/PROM : CVE 4/80/-2. Varghese every 2-3 mins on toco     Pitocin is on at 10mu/min. Will contineu to monitor and will reassess in 2 hours or sooner if needed.       To be d/w Dr. Eduin Costello MD

## 2023-11-08 LAB
ERYTHROCYTE [DISTWIDTH] IN BLOOD BY AUTOMATED COUNT: 12.8 % (ref 11.7–14.4)
HCT VFR BLDCO AUTO: 32.4 % (ref 35–45)
HGB BLD-MCNC: 10.9 G/DL (ref 11.8–15.7)
MCH RBC QN AUTO: 30 PG (ref 28–33.2)
MCHC RBC AUTO-ENTMCNC: 33.6 G/DL (ref 32.2–35.5)
MCV RBC AUTO: 89.3 FL (ref 83–98)
PDW BLD AUTO: 10.6 FL (ref 9.4–12.3)
PLATELET # BLD AUTO: 240 K/UL (ref 150–369)
RBC # BLD AUTO: 3.63 M/UL (ref 3.93–5.22)
WBC # BLD AUTO: 13.42 K/UL (ref 3.8–10.5)

## 2023-11-08 PROCEDURE — 85027 COMPLETE CBC AUTOMATED: CPT | Performed by: NURSE PRACTITIONER

## 2023-11-08 PROCEDURE — 36415 COLL VENOUS BLD VENIPUNCTURE: CPT | Performed by: NURSE PRACTITIONER

## 2023-11-08 PROCEDURE — 63700000 HC SELF-ADMINISTRABLE DRUG: Performed by: NURSE PRACTITIONER

## 2023-11-08 PROCEDURE — 12000000 HC ROOM AND CARE MED/SURG

## 2023-11-08 RX ORDER — IBUPROFEN 600 MG/1
600 TABLET ORAL EVERY 6 HOURS PRN
Qty: 20 TABLET | Refills: 0 | Status: SHIPPED | OUTPATIENT
Start: 2023-11-08 | End: 2023-11-18

## 2023-11-08 RX ORDER — ACETAMINOPHEN 325 MG/1
650 TABLET ORAL EVERY 4 HOURS PRN
Qty: 20 TABLET | Refills: 0 | Status: SHIPPED | OUTPATIENT
Start: 2023-11-08 | End: 2023-12-08

## 2023-11-08 RX ADMIN — ACETAMINOPHEN 650 MG: 325 TABLET ORAL at 20:12

## 2023-11-08 RX ADMIN — DOCUSATE SODIUM 50 MG AND SENNOSIDES 8.6 MG 1 TABLET: 8.6; 5 TABLET, FILM COATED ORAL at 20:12

## 2023-11-08 RX ADMIN — IBUPROFEN 600 MG: 600 TABLET, FILM COATED ORAL at 06:20

## 2023-11-08 RX ADMIN — ACETAMINOPHEN 650 MG: 325 TABLET ORAL at 16:04

## 2023-11-08 RX ADMIN — PRENATAL VITAMINS-IRON FUMARATE 27 MG IRON-FOLIC ACID 0.8 MG TABLET 1 TABLET: at 08:41

## 2023-11-08 RX ADMIN — IBUPROFEN 600 MG: 600 TABLET, FILM COATED ORAL at 11:48

## 2023-11-08 RX ADMIN — IBUPROFEN 600 MG: 600 TABLET, FILM COATED ORAL at 17:42

## 2023-11-08 RX ADMIN — IBUPROFEN 600 MG: 600 TABLET, FILM COATED ORAL at 23:54

## 2023-11-08 RX ADMIN — DOCUSATE SODIUM 50 MG AND SENNOSIDES 8.6 MG 1 TABLET: 8.6; 5 TABLET, FILM COATED ORAL at 08:41

## 2023-11-08 NOTE — ANESTHESIA POSTPROCEDURE EVALUATION
Patient: Grace Mobley    Procedure Summary     Date: 11/07/23 Room / Location:     Anesthesia Start: 1105 Anesthesia Stop: 1358    Procedure: Labor Analgesia Diagnosis:     Scheduled Providers:  Responsible Provider: Carl Costello MD    Anesthesia Type: epidural ASA Status: 2 - Emergent          Anesthesia Type: epidural  PACU Vitals    No data found in the last 10 encounters.           Anesthesia Post Evaluation    Pain management: adequate  Patient participation: complete - patient participated  Level of consciousness: awake and alert  Cardiovascular status: acceptable  Airway Patency: adequate  Respiratory status: acceptable  Hydration status: acceptable  Anesthetic complications: no

## 2023-11-08 NOTE — PLAN OF CARE
Problem: Adult Inpatient Plan of Care  Goal: Plan of Care Review  Outcome: Progressing  Flowsheets (Taken 2023 0025)  Progress: improving  Outcome Evaluation: Pt recovering from  at 38 weeks. Pt VSS. Bleeding WNL. Pain is under control. Pt successfully breastfeeding and bonding well with infant.  Plan of Care Reviewed With: patient

## 2023-11-08 NOTE — DISCHARGE SUMMARY
Inpatient Discharge Summary    BRIEF OVERVIEW  Admitting Provider: Eileen Rojas DO  Discharge Provider: Eileen Rojas DO  Primary Care Physician at Discharge: Manasa Gutierrez CRNP [phone]    Admission Date: 11/7/2023     Discharge Date: 11/9/2023    Primary Discharge Diagnosis  Normal pregnancy, unspecified trimester    Secondary Discharge Diagnosis  None    Discharge Disposition  Home     Discharge Medications     Medication List      START taking these medications    acetaminophen 325 mg tablet  Commonly known as: TYLENOL  Take 2 tablets (650 mg total) by mouth every 4 (four) hours as needed for pain (breakthrough pain).  Dose: 650 mg     ibuprofen 600 mg tablet  Commonly known as: MOTRIN  Take 1 tablet (600 mg total) by mouth every 6 (six) hours as needed for pain for up to 10 days.  Dose: 600 mg        CONTINUE taking these medications    AZO CRANBERRY ORAL  Take by mouth.     CULTURELLE IBS COMPLETE SUPPRT ORAL  Take by mouth.     prenatal vit no.130-iron-folic 27 mg iron- 800 mcg tablet tablet  Take 1 tablet by mouth daily.  Dose: 1 tablet            Active Issues Requiring Follow-up  Issue: None  Responsible Individual: Patient  What is Needed: Appropriate follow-up as needed      Outpatient Follow-Up  Encounter Information    This patient does not currently have any appointments scheduled.         Test Results Pending at Discharge  Unresulted Labs (From admission, onward)    None          DETAILS OF HOSPITAL STAY    Presenting Problem/History of Present Illness  Normal pregnancy, unspecified trimester [Z34.90]      Hospital Course/Operative Procedures Performed      Procedures: Spontaneous vaginal delivery    Marquez Terry MD

## 2023-11-08 NOTE — PROGRESS NOTES
Obstetrics Vaginal Delivery Postpartum Progress Note    Events  Patient seen and examined.  No acute events overnight.    Subjective  Denies HA, CP, SOB, N/V and F/C.  No complaints.   Pain: well controlled   Bleeding: lochia minimal   Diet: taking regular diet   Bowel: passing flatus   Voiding: without difficulty   Ambulating: as tolerated     Vitals  Temp:  [36.3 °C (97.3 °F)-37.2 °C (99 °F)] 36.4 °C (97.5 °F)  Heart Rate:  [] 65  Resp:  [16-18] 18  BP: ()/(51-82) 109/55    Physical Exam  General: well and resting  Heart: Regular rate and rhythm  Lungs: Clear to auscultation bilaterally  Abdomen: soft, nondistended, non-tender  Fundus: firm and below umbilicus  Extremities: symmetric and no edema    Labs  Labs Reviewed:  Lab Results   Component Value Date    ABO O 2023    LABRH Positive 2023      Results from last 7 days   Lab Units 23  0734   WBC K/uL 13.38*   RBC M/uL 4.32   HEMOGLOBIN g/dL 13.0   HEMATOCRIT % 38.4   MCV fL 88.9   MCH pg 30.1   MCHC g/dL 33.9   RDW % 12.8   PLATELETS K/uL 281   MPV fL 10.6     Rubella: immune  Rubella IgG Scr   Date Value Ref Range Status   2023 immune  Final     Syphilis Ab   Date Value Ref Range Status   2023 Nonreactive Nonreactive Final     RPR   Date Value Ref Range Status   2018 Non-reactive Non-reactive Final     Hepatitis B Surface Ag   Date Value Ref Range Status   2023 Nonreactive Nonreactive Final       Assessment/Plan   Problem-based Assessment and Plan    Grace Mobley is a 38 y.o.  PPD#1 s/p  .    1. Vitals: Stable. Patient had an isolated severe range pressure while pushing.   2. Hemodynamics: Hgb 13.0 --> AM CBC pending. No signs or symptoms of acute anemia.    3. Pain: Currently well controlled.  Continue current medications.   4. VTE assessment: Early Ambulation.   5. Vaccinations/Rhogam: Rhogam not indicated.   6. Postpartum care: Meeting appropriate postpartum milestones.         Toya  Neeta Costello MD

## 2023-11-08 NOTE — PLAN OF CARE
Plan of Care Review  Plan of Care Reviewed With: patient, spouse  Progress: improving  Outcome Evaluation: vital signs stable.  pain well managed.  breast feeding independently.  voiding spontaneously

## 2023-11-08 NOTE — LACTATION NOTE
"Day 1, 3rd baby, Hx of breastfeeding 2 years + each baby.  Baby is currently on bili bed for jaundice.   Mom would prefer not to pump at this time for jaundice.  Will pump if pediatrician recommends    Mom reports baby is latching well but cluster feeding.  LC assisted with latching, signs of a deep latch.    Mom states pediatrician told her baby has tongue tie, will see if doctor available to clip.  Baby does well with finger sucking, good lateralization, slight indent at tip of tongue when extended, weak cupping.  Pediatrician aware      Reviewed breastfeeding education & breastfeeding chapter of \"New Baby\" book.  Taught mom hand expression, + colostrum drops.   Mom is feeding every 2-3 hours.    Reviewed outpatient lactation resources.  All questions answered and support given.      Mom aware she can request lactation for latch support.  Mom has new breast pump at home.   "

## 2023-11-09 VITALS
WEIGHT: 147 LBS | TEMPERATURE: 98.4 F | BODY MASS INDEX: 25.1 KG/M2 | DIASTOLIC BLOOD PRESSURE: 66 MMHG | RESPIRATION RATE: 18 BRPM | OXYGEN SATURATION: 99 % | HEART RATE: 82 BPM | SYSTOLIC BLOOD PRESSURE: 122 MMHG | HEIGHT: 64 IN

## 2023-11-09 PROCEDURE — 63700000 HC SELF-ADMINISTRABLE DRUG: Performed by: NURSE PRACTITIONER

## 2023-11-09 RX ADMIN — IBUPROFEN 600 MG: 600 TABLET, FILM COATED ORAL at 13:03

## 2023-11-09 RX ADMIN — DIBUCAINE 1 APPLICATION: 0.28 OINTMENT TOPICAL at 13:58

## 2023-11-09 RX ADMIN — IBUPROFEN 600 MG: 600 TABLET, FILM COATED ORAL at 06:20

## 2023-11-09 RX ADMIN — PRENATAL VITAMINS-IRON FUMARATE 27 MG IRON-FOLIC ACID 0.8 MG TABLET 1 TABLET: at 09:03

## 2023-11-09 NOTE — LACTATION NOTE
Per mom, infant BF well. At times, she hears clicking and tenderness with initial latch.    Oral assessment done on finger. Tongue did not lift to the roof of mouth or extend past gumline. Strong suck on finger noted. Possible tight posterior tongue tie noted.     MOB latched infant and LC heard clicking with feed.     LC demonstrated techniques to help with a deeper latch.        Reviewed engorgement management. Mom reports +colostrum. Reviewed BF instructions for d/c including expected milk supply, feeding on demand and at least 8x in 24 hours, and monitoring diaper output. Aware of LC out patient resources for home. Questions answered and support given.

## 2023-11-09 NOTE — PROGRESS NOTES
Attending progress note:    I have seen and examined Grace this morning.  She is doing very well.  Her pain is well controlled, and her uterus is firm and at the -3 fingerbreadths level below her umbilicus.    Her pain is well controlled.  We discussed that the pediatrics team is following her baby very carefully for an elevated bilirubin, but if the pediatricians feel that it would be acceptable for the baby to be discharged today, that will be the case.    If not, the baby will stay until the pediatrician is happy with the bilirubin level.    We reviewed routine postdelivery milestones and instructions, and we plan to see Grace back in approximately 6 weeks.    Marquez Terry MD

## 2023-11-09 NOTE — PLAN OF CARE
Problem: Adult Inpatient Plan of Care  Goal: Plan of Care Review  Outcome: Progressing  Flowsheets (Taken 11/8/2023 2146)  Outcome Evaluation: VSS. Ambulating without difficulty. Voiding without difficulty. Pain well controlled.  Goal: Patient-Specific Goal (Individualized)  Outcome: Progressing  Goal: Absence of Hospital-Acquired Illness or Injury  Outcome: Progressing  Goal: Optimal Comfort and Wellbeing  Outcome: Progressing  Goal: Readiness for Transition of Care  Outcome: Progressing     Problem: Breastfeeding  Goal: Effective Breastfeeding  Outcome: Progressing     Problem: Postpartum (Vaginal Delivery)  Goal: Successful Maternal Role Transition  Outcome: Progressing  Goal: Hemostasis  Outcome: Progressing  Goal: Absence of Infection Signs and Symptoms  Outcome: Progressing  Goal: Anesthesia/Sedation Recovery  Outcome: Progressing  Goal: Optimal Pain Control and Function  Outcome: Progressing  Goal: Effective Urinary Elimination  Outcome: Progressing   Plan of Care Review  Outcome Evaluation: VSS. Ambulating without difficulty. Voiding without difficulty. Pain well controlled.

## 2023-11-09 NOTE — PLAN OF CARE
Plan of Care Review  Plan of Care Reviewed With: patient  Progress: improving  Outcome Evaluation: Patient stable for discharge.  Patient verbalizes understanding of discharge instructions

## 2025-01-30 ENCOUNTER — TELEPHONE (OUTPATIENT)
Age: 40
End: 2025-01-30

## 2025-01-30 NOTE — TELEPHONE ENCOUNTER
Patient called to see if she could get an appt ASAP for a second opinion for a kidney stone that is 7mm. Pt states she can urinate fine, so she doesn't think it is obstructing.    Pt had ED visit and imaging done at [facility]. Pt states she had an appt with Urologist at [facility] and was told that she'd need surgery due to them not thinking that she can pass it naturally.     Pt's office of choice is [facility]. Their avail is limited and in March. Pt was offered 2/13 appt in [place] which is 45 mins from her home. Pt stated she did not trust herself to drive and asked about a virtual visit. I explained to the pt that NP Virtual visits for Kidney stones are not scheduled by our group. Pt states she's in a lot of pain and wouldn't want to wait that long. Will stay with current office.    No further action needed at this time.

## 2025-04-24 ENCOUNTER — TRANSCRIBE ORDERS (OUTPATIENT)
Dept: SCHEDULING | Age: 40
End: 2025-04-24

## 2025-04-24 DIAGNOSIS — N20.0 KIDNEY STONE: Primary | ICD-10-CM

## 2025-04-29 ENCOUNTER — HOSPITAL ENCOUNTER (OUTPATIENT)
Dept: RADIOLOGY | Age: 40
Discharge: HOME | End: 2025-04-29
Attending: UROLOGY
Payer: COMMERCIAL

## 2025-04-29 DIAGNOSIS — N20.0 KIDNEY STONE: ICD-10-CM

## 2025-04-29 PROCEDURE — 76775 US EXAM ABDO BACK WALL LIM: CPT

## (undated) DEVICE — APPLICATOR CHLORAPREP 26ML ORANGE TINT

## (undated) DEVICE — SUTURE MONOCRYL 4-0 Y426H PS-2 27IN

## (undated) DEVICE — SUTURE CHROMIC GUT 3-0  800H

## (undated) DEVICE — PACK TOWEL OR

## (undated) DEVICE — ***USE 138537*** SUTURE VICRYL 0 J340H CT-1 27IN VIOLET

## (undated) DEVICE — ***USE 56892*** SUTURE CHROMIC GUT 1-0  905H

## (undated) DEVICE — SPONGE LAP DISPOSABLE 18X18

## (undated) DEVICE — PENCIL ESU HANDSWITCHING W/HOL

## (undated) DEVICE — ***USE 56952*** SUTURE VICRYL 1 J371H CTX 36IN VIOLET